# Patient Record
Sex: MALE | URBAN - METROPOLITAN AREA
[De-identification: names, ages, dates, MRNs, and addresses within clinical notes are randomized per-mention and may not be internally consistent; named-entity substitution may affect disease eponyms.]

---

## 2023-08-16 ENCOUNTER — APPOINTMENT (OUTPATIENT)
Dept: URBAN - METROPOLITAN AREA CLINIC 253 | Age: 88
Setting detail: DERMATOLOGY
End: 2023-08-17

## 2023-08-16 VITALS — HEIGHT: 67 IN | RESPIRATION RATE: 14 BRPM | WEIGHT: 170 LBS

## 2023-08-16 DIAGNOSIS — L72.0 EPIDERMAL CYST: ICD-10-CM

## 2023-08-16 PROBLEM — C44.519 BASAL CELL CARCINOMA OF SKIN OF OTHER PART OF TRUNK: Status: ACTIVE | Noted: 2023-08-16

## 2023-08-16 PROCEDURE — OTHER ADDITIONAL NOTES: OTHER

## 2023-08-16 PROCEDURE — 99202 OFFICE O/P NEW SF 15 MIN: CPT | Mod: 25

## 2023-08-16 PROCEDURE — OTHER CURETTAGE AND DESTRUCTION: OTHER

## 2023-08-16 PROCEDURE — 17263 DSTRJ MAL LES T/A/L 2.1-3.0: CPT

## 2023-08-16 PROCEDURE — OTHER REVIEW OF OUTSIDE PATHOLOGY REPORTS: OTHER

## 2023-08-16 PROCEDURE — OTHER MIPS QUALITY: OTHER

## 2023-08-16 PROCEDURE — OTHER COUNSELING: OTHER

## 2023-08-16 ASSESSMENT — LOCATION SIMPLE DESCRIPTION DERM: LOCATION SIMPLE: LEFT CHEEK

## 2023-08-16 ASSESSMENT — LOCATION ZONE DERM: LOCATION ZONE: FACE

## 2023-08-16 ASSESSMENT — LOCATION DETAILED DESCRIPTION DERM: LOCATION DETAILED: LEFT SUPERIOR CENTRAL MALAR CHEEK

## 2023-08-16 NOTE — PROCEDURE: ADDITIONAL NOTES
Additional Notes: Reviewed outside pathology report with patient and patients daughter. Patients daughter informed patient is scheduled for Mohs procedure at outside clinic through [facility] on 11/30/2023. Patient states he “is 89 years old and is okay with watching lesion.” Discussed RBSE of both ED&C vs Mohs procedure in detail. Patient states he would rather take care of lesion now then have to worry and wait for surgery. ED&C procedure completed today without complications. Informed patient and daughter of success rates of both procedures. Recommend follow up to recheck site Additional Notes: Reviewed outside pathology report with patient and patients daughter. Patients daughter informed patient is scheduled for Mohs procedure at outside clinic through [facility] on 11/30/2023. Patient states he “is 89 years old and is okay with watching lesion.” Discussed RBSE of both ED&C vs Mohs procedure in detail. Patient states he would rather take care of lesion now then have to worry and wait for surgery. ED&C procedure completed today without complications. Informed patient and daughter of success rates of both procedures. Recommend follow up to recheck site

## 2023-08-16 NOTE — PROCEDURE: CURETTAGE AND DESTRUCTION
Body Location Override (Optional - Billing Will Still Be Based On Selected Body Map Location If Applicable): left upper chest
Detail Level: Detailed
Number Of Curettages: 3
Size Of Lesion In Cm: 2
Size Of Lesion After Curettage: 2.5
Add Intralesional Injection: No
Total Volume (Ccs): 1
Anesthesia Type: 1% lidocaine with epinephrine and a 1:10 solution of 8.4% sodium bicarbonate
Cautery Type: electrodesiccation
What Was Performed First?: Curettage
Final Size Statement: The size of the lesion after curettage was
Additional Information: (Optional): The wound was cleaned, and a pressure dressing was applied.  The patient received detailed post-op instructions.
Consent was obtained from the patient. The risks, benefits and alternatives to therapy were discussed in detail. Specifically, the risks of infection, scarring, bleeding, prolonged wound healing, nerve injury, incomplete removal, allergy to anesthesia and recurrence were addressed. Alternatives to ED&C, such as: surgical removal and XRT were also discussed.  Prior to the procedure, the treatment site was clearly identified and confirmed by the patient. All components of Universal Protocol/PAUSE Rule completed.
Render Post-Care Instructions In Note?: yes
Post-Care Instructions: I reviewed with the patient in detail post-care instructions. Patient is to keep the area dry for 48 hours, and not to engage in any swimming until the area is healed. Should the patient develop any fevers, chills, bleeding, severe pain patient will contact the office immediately.
Bill As A Line Item Or As Units: Line Item

## 2023-08-16 NOTE — HPI: SKIN LESION (BASAL CELL CARCINOMA)
How Severe Is Your Skin Cancer?: moderate
Is This A New Presentation, Or A Follow-Up?: Basal Cell Carcinoma
Additional History: He presents today to discusss tx of a biopsy proven BCC on the left upper chest. He and his daughter Pat are concerned with having to wait until 11/30/23 to have treated with [facility].
Location From Outside Provider (Will Override Previously Chosen Location): [facility]
When Was Basal Cell Biopsied? (Optional): 8/3/23

## 2023-08-16 NOTE — PROCEDURE: COUNSELING
Detail Level: Detailed
Patient Specific Counseling (Will Not Stick From Patient To Patient): Reassured him that it benign appearing on exam.

## 2023-08-16 NOTE — PROCEDURE: REVIEW OF OUTSIDE PATHOLOGY REPORTS
Detail Level: Zone
Summary Of Outside Pathology: Nodular basal cell carcinoma of left upper chest, margins involved

## 2024-10-06 ENCOUNTER — HOSPITAL ENCOUNTER (OUTPATIENT)
Facility: CLINIC | Age: 89
Setting detail: OBSERVATION
Discharge: HOME | End: 2024-10-07
Attending: EMERGENCY MEDICINE | Admitting: STUDENT IN AN ORGANIZED HEALTH CARE EDUCATION/TRAINING PROGRAM
Payer: MEDICARE

## 2024-10-06 ENCOUNTER — APPOINTMENT (OUTPATIENT)
Dept: MRI IMAGING | Facility: CLINIC | Age: 89
End: 2024-10-06
Attending: EMERGENCY MEDICINE
Payer: MEDICARE

## 2024-10-06 ENCOUNTER — APPOINTMENT (OUTPATIENT)
Dept: CT IMAGING | Facility: CLINIC | Age: 89
End: 2024-10-06
Attending: EMERGENCY MEDICINE
Payer: MEDICARE

## 2024-10-06 ENCOUNTER — APPOINTMENT (OUTPATIENT)
Dept: ULTRASOUND IMAGING | Facility: CLINIC | Age: 89
End: 2024-10-06
Attending: EMERGENCY MEDICINE
Payer: MEDICARE

## 2024-10-06 DIAGNOSIS — G45.9 TIA (TRANSIENT ISCHEMIC ATTACK): ICD-10-CM

## 2024-10-06 DIAGNOSIS — R29.818 TRANSIENT NEUROLOGIC DEFICIT: Primary | ICD-10-CM

## 2024-10-06 DIAGNOSIS — R47.01 EXPRESSIVE APHASIA: ICD-10-CM

## 2024-10-06 LAB
ALBUMIN UR-MCNC: NEGATIVE MG/DL
ANION GAP SERPL CALCULATED.3IONS-SCNC: 12 MMOL/L (ref 7–15)
APPEARANCE UR: CLEAR
APTT PPP: 28 SECONDS (ref 22–38)
BASOPHILS # BLD AUTO: 0 10E3/UL (ref 0–0.2)
BASOPHILS NFR BLD AUTO: 0 %
BILIRUB UR QL STRIP: NEGATIVE
BUN SERPL-MCNC: 29 MG/DL (ref 8–23)
CALCIUM SERPL-MCNC: 8.9 MG/DL (ref 8.8–10.4)
CHLORIDE SERPL-SCNC: 99 MMOL/L (ref 98–107)
COLOR UR AUTO: NORMAL
CREAT SERPL-MCNC: 1.1 MG/DL (ref 0.67–1.17)
CRP SERPL-MCNC: 14.61 MG/L
EGFRCR SERPLBLD CKD-EPI 2021: 61 ML/MIN/1.73M2
EOSINOPHIL # BLD AUTO: 0.3 10E3/UL (ref 0–0.7)
EOSINOPHIL NFR BLD AUTO: 4 %
ERYTHROCYTE [DISTWIDTH] IN BLOOD BY AUTOMATED COUNT: 14.6 % (ref 10–15)
ERYTHROCYTE [SEDIMENTATION RATE] IN BLOOD BY WESTERGREN METHOD: 26 MM/HR (ref 0–20)
EST. AVERAGE GLUCOSE BLD GHB EST-MCNC: 117 MG/DL
GLUCOSE SERPL-MCNC: 130 MG/DL (ref 70–99)
GLUCOSE UR STRIP-MCNC: NEGATIVE MG/DL
HBA1C MFR BLD: 5.7 %
HCO3 SERPL-SCNC: 22 MMOL/L (ref 22–29)
HCT VFR BLD AUTO: 34 % (ref 40–53)
HGB BLD-MCNC: 11.1 G/DL (ref 13.3–17.7)
HGB UR QL STRIP: NEGATIVE
HOLD SPECIMEN: NORMAL
IMM GRANULOCYTES # BLD: 0.1 10E3/UL
IMM GRANULOCYTES NFR BLD: 1 %
INR PPP: 1.03 (ref 0.85–1.15)
KETONES UR STRIP-MCNC: NEGATIVE MG/DL
LEUKOCYTE ESTERASE UR QL STRIP: NEGATIVE
LYMPHOCYTES # BLD AUTO: 0.9 10E3/UL (ref 0.8–5.3)
LYMPHOCYTES NFR BLD AUTO: 11 %
MCH RBC QN AUTO: 30.4 PG (ref 26.5–33)
MCHC RBC AUTO-ENTMCNC: 32.6 G/DL (ref 31.5–36.5)
MCV RBC AUTO: 93 FL (ref 78–100)
MONOCYTES # BLD AUTO: 0.6 10E3/UL (ref 0–1.3)
MONOCYTES NFR BLD AUTO: 7 %
NEUTROPHILS # BLD AUTO: 6.6 10E3/UL (ref 1.6–8.3)
NEUTROPHILS NFR BLD AUTO: 78 %
NITRATE UR QL: NEGATIVE
NRBC # BLD AUTO: 0 10E3/UL
NRBC BLD AUTO-RTO: 0 /100
PH UR STRIP: 6.5 [PH] (ref 5–7)
PLATELET # BLD AUTO: 239 10E3/UL (ref 150–450)
POTASSIUM SERPL-SCNC: 4.3 MMOL/L (ref 3.4–5.3)
RBC # BLD AUTO: 3.65 10E6/UL (ref 4.4–5.9)
RBC URINE: <1 /HPF
SODIUM SERPL-SCNC: 133 MMOL/L (ref 135–145)
SP GR UR STRIP: 1.02 (ref 1–1.03)
TROPONIN T SERPL HS-MCNC: 26 NG/L
UROBILINOGEN UR STRIP-MCNC: NORMAL MG/DL
WBC # BLD AUTO: 8.5 10E3/UL (ref 4–11)
WBC URINE: <1 /HPF

## 2024-10-06 PROCEDURE — 255N000002 HC RX 255 OP 636: Performed by: EMERGENCY MEDICINE

## 2024-10-06 PROCEDURE — 70496 CT ANGIOGRAPHY HEAD: CPT | Mod: MG

## 2024-10-06 PROCEDURE — 80061 LIPID PANEL: CPT | Performed by: STUDENT IN AN ORGANIZED HEALTH CARE EDUCATION/TRAINING PROGRAM

## 2024-10-06 PROCEDURE — 93005 ELECTROCARDIOGRAM TRACING: CPT

## 2024-10-06 PROCEDURE — 250N000011 HC RX IP 250 OP 636: Performed by: EMERGENCY MEDICINE

## 2024-10-06 PROCEDURE — 85610 PROTHROMBIN TIME: CPT | Performed by: EMERGENCY MEDICINE

## 2024-10-06 PROCEDURE — 83036 HEMOGLOBIN GLYCOSYLATED A1C: CPT | Performed by: STUDENT IN AN ORGANIZED HEALTH CARE EDUCATION/TRAINING PROGRAM

## 2024-10-06 PROCEDURE — 86140 C-REACTIVE PROTEIN: CPT | Performed by: EMERGENCY MEDICINE

## 2024-10-06 PROCEDURE — 84484 ASSAY OF TROPONIN QUANT: CPT | Performed by: EMERGENCY MEDICINE

## 2024-10-06 PROCEDURE — 99222 1ST HOSP IP/OBS MODERATE 55: CPT | Mod: AI | Performed by: STUDENT IN AN ORGANIZED HEALTH CARE EDUCATION/TRAINING PROGRAM

## 2024-10-06 PROCEDURE — 85025 COMPLETE CBC W/AUTO DIFF WBC: CPT | Performed by: EMERGENCY MEDICINE

## 2024-10-06 PROCEDURE — 99285 EMERGENCY DEPT VISIT HI MDM: CPT | Mod: 25

## 2024-10-06 PROCEDURE — 80048 BASIC METABOLIC PNL TOTAL CA: CPT | Performed by: EMERGENCY MEDICINE

## 2024-10-06 PROCEDURE — A9585 GADOBUTROL INJECTION: HCPCS | Performed by: EMERGENCY MEDICINE

## 2024-10-06 PROCEDURE — 250N000013 HC RX MED GY IP 250 OP 250 PS 637: Performed by: EMERGENCY MEDICINE

## 2024-10-06 PROCEDURE — 70553 MRI BRAIN STEM W/O & W/DYE: CPT | Mod: MG

## 2024-10-06 PROCEDURE — 70498 CT ANGIOGRAPHY NECK: CPT | Mod: MG

## 2024-10-06 PROCEDURE — 85652 RBC SED RATE AUTOMATED: CPT | Performed by: EMERGENCY MEDICINE

## 2024-10-06 PROCEDURE — G0378 HOSPITAL OBSERVATION PER HR: HCPCS

## 2024-10-06 PROCEDURE — 93880 EXTRACRANIAL BILAT STUDY: CPT

## 2024-10-06 PROCEDURE — 85730 THROMBOPLASTIN TIME PARTIAL: CPT | Performed by: EMERGENCY MEDICINE

## 2024-10-06 PROCEDURE — G1010 CDSM STANSON: HCPCS

## 2024-10-06 PROCEDURE — 99207 PR NO BILLABLE SERVICE THIS VISIT: CPT | Performed by: NURSE PRACTITIONER

## 2024-10-06 PROCEDURE — 36415 COLL VENOUS BLD VENIPUNCTURE: CPT | Performed by: EMERGENCY MEDICINE

## 2024-10-06 PROCEDURE — 81001 URINALYSIS AUTO W/SCOPE: CPT | Performed by: EMERGENCY MEDICINE

## 2024-10-06 RX ORDER — ASPIRIN 81 MG/1
81 TABLET ORAL DAILY
Status: DISCONTINUED | OUTPATIENT
Start: 2024-10-07 | End: 2024-10-07 | Stop reason: HOSPADM

## 2024-10-06 RX ORDER — CLOPIDOGREL BISULFATE 75 MG/1
75 TABLET ORAL DAILY
Status: DISCONTINUED | OUTPATIENT
Start: 2024-10-07 | End: 2024-10-07 | Stop reason: HOSPADM

## 2024-10-06 RX ORDER — ONDANSETRON 4 MG/1
4 TABLET, ORALLY DISINTEGRATING ORAL EVERY 6 HOURS PRN
Status: DISCONTINUED | OUTPATIENT
Start: 2024-10-06 | End: 2024-10-07 | Stop reason: HOSPADM

## 2024-10-06 RX ORDER — IOPAMIDOL 755 MG/ML
67 INJECTION, SOLUTION INTRAVASCULAR ONCE
Status: COMPLETED | OUTPATIENT
Start: 2024-10-06 | End: 2024-10-06

## 2024-10-06 RX ORDER — ASPIRIN 81 MG/1
81 TABLET, CHEWABLE ORAL ONCE
Status: COMPLETED | OUTPATIENT
Start: 2024-10-06 | End: 2024-10-06

## 2024-10-06 RX ORDER — AMOXICILLIN 250 MG
1 CAPSULE ORAL 2 TIMES DAILY PRN
Status: DISCONTINUED | OUTPATIENT
Start: 2024-10-06 | End: 2024-10-07 | Stop reason: HOSPADM

## 2024-10-06 RX ORDER — CLOPIDOGREL BISULFATE 75 MG/1
300 TABLET ORAL ONCE
Status: COMPLETED | OUTPATIENT
Start: 2024-10-06 | End: 2024-10-06

## 2024-10-06 RX ORDER — ONDANSETRON 2 MG/ML
4 INJECTION INTRAMUSCULAR; INTRAVENOUS EVERY 6 HOURS PRN
Status: DISCONTINUED | OUTPATIENT
Start: 2024-10-06 | End: 2024-10-07 | Stop reason: HOSPADM

## 2024-10-06 RX ORDER — AMOXICILLIN 250 MG
2 CAPSULE ORAL 2 TIMES DAILY PRN
Status: DISCONTINUED | OUTPATIENT
Start: 2024-10-06 | End: 2024-10-07 | Stop reason: HOSPADM

## 2024-10-06 RX ORDER — GADOBUTROL 604.72 MG/ML
8 INJECTION INTRAVENOUS ONCE
Status: COMPLETED | OUTPATIENT
Start: 2024-10-06 | End: 2024-10-06

## 2024-10-06 RX ADMIN — IOPAMIDOL 67 ML: 755 INJECTION, SOLUTION INTRAVENOUS at 15:59

## 2024-10-06 RX ADMIN — GADOBUTROL 8 ML: 604.72 INJECTION INTRAVENOUS at 17:51

## 2024-10-06 RX ADMIN — ASPIRIN 81 MG CHEWABLE TABLET 81 MG: 81 TABLET CHEWABLE at 21:30

## 2024-10-06 RX ADMIN — CLOPIDOGREL BISULFATE 300 MG: 75 TABLET ORAL at 21:30

## 2024-10-06 ASSESSMENT — COLUMBIA-SUICIDE SEVERITY RATING SCALE - C-SSRS
2. HAVE YOU ACTUALLY HAD ANY THOUGHTS OF KILLING YOURSELF IN THE PAST MONTH?: NO
6. HAVE YOU EVER DONE ANYTHING, STARTED TO DO ANYTHING, OR PREPARED TO DO ANYTHING TO END YOUR LIFE?: NO
1. IN THE PAST MONTH, HAVE YOU WISHED YOU WERE DEAD OR WISHED YOU COULD GO TO SLEEP AND NOT WAKE UP?: NO

## 2024-10-06 ASSESSMENT — ACTIVITIES OF DAILY LIVING (ADL)
ADLS_ACUITY_SCORE: 35

## 2024-10-06 NOTE — ED TRIAGE NOTES
Pt c/o L arm numbness which started this morning around 9 am- stated numbness went from his fingers to his upper arm. Also stated his vision went black. VSS Hx macular degeneration- full code.      Triage Assessment (Adult)       Row Name 10/06/24 1405          Triage Assessment    Airway WDL WDL        Respiratory WDL    Respiratory WDL WDL        Skin Circulation/Temperature WDL    Skin Circulation/Temperature WDL WDL        Cardiac WDL    Cardiac WDL WDL

## 2024-10-06 NOTE — CONSULTS
"  [facility]    Stroke Telephone Note    I was called by Lance Merino on 10/06/24 regarding patient Berhane Ely. The patient is a 89 year old male with PMH of polymyalgia rheumatica, HTN, HLD. He presents to the ED for evaluation of confusion, speech changes. Per family, yesterday he had an episode where he appeared confused, was having difficulty speaking and running/bumping into things with his walker. He also noted some LUE numbness. Symptoms resolved. He then had another similar episode this morning that lasted about 60 minutes and resolved. Per ED provider no current deficits on exam, patient appears to be at/near baseline.    Vitals  BP: 122/61   Pulse: 67   Resp: 20   Temp: 97.4  F (36.3  C)   Weight: 75.3 kg (166 lb 0.1 oz)    Imaging Findings  Pending     Impression  Two episodes of confusion, altered speech, LUE numbness. Concern for possible TIA with vascular pathology (ie fixed stenosis) vs TFNE vs other     Recommendations  -CT/CTA; if unrevealing recommend brain MRI w/wo contrast + SWI  -ESR, CRP    Case discussed with vascular neurology attending Dr. Sierra.    My recommendations are based on the information provided over the phone by Berhane Ely's in-person providers. They are not intended to replace the clinical judgment of his in-person providers. I was not requested to personally see or examine the patient at this time.     AL Sellers CNP  Vascular Neurology    To page me or covering stroke neurology team member, click here: [facility]COM  Choose \"On Call\" tab at top, then select \"NEUROLOGY/ALL SITES\" from middle drop-down box, press Enter, then look for \"stroke\" or \"telestroke\" for your site.   "

## 2024-10-06 NOTE — ED PROVIDER NOTES
"  Emergency Department Note      History of Present Illness     Chief Complaint   Numbness, confusion    HPI   Berhane Ely is a 89 year old male who presents with daughter and son-in-law for evaluation of left arm numbness and confusion.  Patient lives independently with his wife.  Daughter states she was called by her mother yesterday morning as she was concerned patient was having trouble speaking and running into things with his walker which is abnormal for him.  When she arrived around 3PM, patient apparently was at his baseline and was not having any trouble with speaking and she did not notice anything off.  Patient also stated to her he had left arm numbness and tingling but does not have this currently.  He says this is a chronic problem for him but was more intense yesterday.  She again received phone call from her mom today as patient was running into things and was worried about his vision.  When she got there at 12PM, she noticed patient was having trouble expressing what he wanted to say (e.x. getting stuck at saying \"Junaid, Junaid, Trump\").  This lasted for about 1 hour and has completely resolved since arriving to the ER.  Patient denies headache, dizziness, change or loss in his baseline vision, current numbness, extremity weakness, fever, chest pain, SOB, UTI symptoms.      Independent Historian   Daughter and son-in-law    Review of External Notes   None    Past Medical History     Medical History and Problem List   Age-related osteoporosis without current pathological fracture 10/02/2017     Low back pain 05/16/2017     Polymyalgia rheumatica 12/02/2016     Long term (current) use of systemic steroids 12/02/2016     Multiple joint pain 10/07/2016     Acute pain of both shoulders 10/07/2016     Elevated erythrocyte sedimentation rate 10/07/2016     History of gout 10/07/2016        Medications   simvastatin (ZOCOR) 20 MG tablet  TK 1 T PO HS   1 08/30/2016   Active   amLODIPine (NORVASC) 2.5 MG " tablet  TK 1 T PO D   1 08/08/2016   Active   losartan (COZAAR) 100 MG tablet  TK 1 T PO D   1 07/02/2016   Active   aspirin 81 MG tablet  Take 81 mg by mouth daily.         Active   acetaminophen (TYLENOL) 325 MG tablet  Take 325-650 mg by mouth every 4 hours as needed for Pain.         Active   Multiple Vitamins-Minerals (ICAPS AREDS 2 OR)  1 Tab two times a day.         Active   drug not in computer  Avastin injection in right eye as needed. No injection received since December.         Active   calcium carbonate-vitamin D (OS-LIANET 500 WITH D) 500-200 MG-UNIT per tablet   Indications: Polymyalgia rheumatica (HRC) Take 1 Tab by mouth two times a day.         Active   tamsulosin (FLOMAX) 0.4 MG CAPS capsule   Indications: Polymyalgia rheumatica (HRC) Take 2 Caps by mouth daily at bedtime.   1 09/12/2017   Active   Wheat Dextrin (BENEFIBER)   Indications: Polymyalgia rheumatica (HRC) 2 tsp daily.         Active   Multiple Vitamins-Minerals (ICAPS AREDS 2 OR)  1 Tablet two times a day.         Active     Surgical History   No past surgical history on file.    Physical Exam     Patient Vitals for the past 24 hrs:   BP Temp Temp src Pulse Resp SpO2 Weight   10/06/24 1730 (!) 153/60 -- -- 60 -- 98 % --   10/06/24 1700 (!) 149/60 -- -- 59 -- 100 % --   10/06/24 1635 (!) 151/91 -- -- 65 -- 99 % --   10/06/24 1620 110/64 -- -- 63 -- 98 % --   10/06/24 1540 135/58 -- -- 64 -- 99 % --   10/06/24 1526 -- -- -- 65 -- 99 % --   10/06/24 1511 125/59 -- -- 79 -- 99 % --   10/06/24 1456 (!) 142/79 -- -- 70 -- 100 % --   10/06/24 1441 (!) 154/64 -- -- 66 -- 100 % --   10/06/24 1430 122/61 -- -- 67 -- 97 % --   10/06/24 1424 -- -- -- -- -- -- 75.3 kg (166 lb 0.1 oz)   10/06/24 1417 (!) 141/75 -- -- 81 -- 99 % --   10/06/24 1404 (!) 125/95 97.4  F (36.3  C) Temporal 94 20 -- --     Physical Exam  General: Alert, no acute distress; well appearing; [place], answering questions appropriately  Neuro:    Alert and oriented x 3.    Speech is  normal and fluent. No dysarthria.   Face is symmetric without droop. CN's II-XII grossly intact. Negative pronator drift.   Right Arm: Good  strength. 5/5 elbow flexion. 5/5 elbow extension. Sensation intact to light touch.   Left Arm: Good  strength. 5/5 elbow flexion. 5/5 elbow extension. Sensation intact to light touch.   Right Le/5 straight leg raise. Sensation intact to light touch.   Left Le/5 straight leg raise. Sensation intact to light touch.       Romberg and gait: Deferred  HEENT:  Moist mucous membranes.  Conjunctiva normal.  No temporal artery tenderness or scalp tenderness.   CV:  RRR, no m/r/g, skin warm and well perfused  Pulm:  CTAB, no wheezes/ronchi/rales.  No acute distress, breathing comfortably  GI:  Soft, nontender, nondistended.  No rebound or guarding.    MSK:  Moving all extremities.  No focal areas of edema, erythema  Skin:  WWP, no rashes, no lower extremity edema, skin color normal  Psych:  Well-appearing, normal affect, regular speech    Diagnostics     Lab Results   Labs Ordered and Resulted from Time of ED Arrival to Time of ED Departure   BASIC METABOLIC PANEL - Abnormal       Result Value    Sodium 133 (*)     Potassium 4.3      Chloride 99      Carbon Dioxide (CO2) 22      Anion Gap 12      Urea Nitrogen 29.0 (*)     Creatinine 1.10      GFR Estimate 61      Calcium 8.9      Glucose 130 (*)    TROPONIN T, HIGH SENSITIVITY - Abnormal    Troponin T, High Sensitivity 26 (*)    CBC WITH PLATELETS AND DIFFERENTIAL - Abnormal    WBC Count 8.5      RBC Count 3.65 (*)     Hemoglobin 11.1 (*)     Hematocrit 34.0 (*)     MCV 93      MCH 30.4      MCHC 32.6      RDW 14.6      Platelet Count 239      % Neutrophils 78      % Lymphocytes 11      % Monocytes 7      % Eosinophils 4      % Basophils 0      % Immature Granulocytes 1      NRBCs per 100 WBC 0      Absolute Neutrophils 6.6      Absolute Lymphocytes 0.9      Absolute Monocytes 0.6      Absolute Eosinophils 0.3       Absolute Basophils 0.0      Absolute Immature Granulocytes 0.1      Absolute NRBCs 0.0     ERYTHROCYTE SEDIMENTATION RATE AUTO - Abnormal    Erythrocyte Sedimentation Rate 26 (*)    CRP INFLAMMATION - Abnormal    CRP Inflammation 14.61 (*)    INR - Normal    INR 1.03     PARTIAL THROMBOPLASTIN TIME - Normal    aPTT 28     GLUCOSE MONITOR NURSING POCT       Imaging   CTA Head Neck with Contrast   Final Result   IMPRESSION:    HEAD CT:   1.  No CT evidence for acute intracranial process.   2.  Brain atrophy and presumed chronic microvascular ischemic changes as above.      HEAD CTA:    1.  No large vessel occlusion or hemodynamically significant stenosis throughout the [place] arteries.      NECK CTA:   1.  High-grade approximately 90% stenosis in the proximal left internal carotid artery. Otherwise, no hemodynamically significant stenosis in the neck vessels.    2.  No evidence for dissection.      CT Head w/o Contrast   Final Result   IMPRESSION:    HEAD CT:   1.  No CT evidence for acute intracranial process.   2.  Brain atrophy and presumed chronic microvascular ischemic changes as above.      HEAD CTA:    1.  No large vessel occlusion or hemodynamically significant stenosis throughout the [place] arteries.      NECK CTA:   1.  High-grade approximately 90% stenosis in the proximal left internal carotid artery. Otherwise, no hemodynamically significant stenosis in the neck vessels.    2.  No evidence for dissection.      US Carotid Bilateral    (Results Pending)   MR Brain w/o & w Contrast    (Results Pending)       EKG   ECG results from 10/06/24   EKG 12 lead     Value    Systolic Blood Pressure     Diastolic Blood Pressure     Ventricular Rate 76    Atrial Rate 76    CA Interval 156    QRS Duration 86        QTc 445    P Axis -1    R AXIS -5    T Axis 2    Interpretation ECG      Sinus rhythm with Premature supraventricular complexes  Minimal voltage criteria for LVH, may be normal  variant ( R in aVL )  Borderline ECG  When compared with ECG of 11-Oct-2006 07:30,  Premature supraventricular complexes are now Present  T wave inversion now evident in Inferior leads           Independent Interpretation   None    ED Course      Medications Administered   Medications   iopamidol (ISOVUE-370) solution 67 mL (67 mLs Intravenous $Given 10/6/24 2893)   sodium chloride (PF) 0.9% PF flush 80 mL (80 mLs Intravenous $Given 10/6/24 7656)   gadobutrol (GADAVIST) injection 8 mL (8 mLs Intravenous $Given 10/6/24 4393)       Procedures   Procedures     Discussion of Management   Stroke Neurology, Isa VILLALOBOS. Agrees with imaging; requests ESR/CRP due to hx of PMR.     Stroke Neurology, Dr. Sierra.  Recommends carotid duplex US, MRI brain.  Will follow up with results for recommendations and final dispo.    ED Course        Additional Documentation  None    Medical Decision Making / Diagnosis     CMS Diagnoses: None    MIPS       None    MDM   Berhane Ely is a 89 year old male with history of HTN, HLD presenting with family for concerns of now resolved symptoms of left arm numbness and confusion.  See above for the details in the HPI.  Description of confusion seems to be most consistent with expressive aphasia which apparently happened yesterday and again this morning per daughter.  There has been no weakness or facial drooping. He is oriented and answering questions appropriately without dysarthria or aphasia and has an unremarkable neuro exam.  I am most concerned for crescendo TIA given his symptoms.  He is not a candidate for thrombolytics.  Initial CT imaging is negative, left internal CA noted to have 90% stenosis.  EKG is unremarkable; initial troponin is only mildly elevated, delta troponin is pending. He has no chest pain and I have very low suspicion for ACS at this time. Additional basic labs are unremarkable; ESR and CRP requested by stroke neuro due to hx of PMR are slightly elevated  and nonspecific; he has no headache or scalp/temporal artery tenderness concerning for giant cell arteritis. Patient remains neurologically at his baseline currently.  He is awaiting MRI brain and carotid US at the request of stroke neuro - patient signed out to my partner pending imaging and stroke neuro recommendations for final disposition.     Disposition   Pending MRI, duplex carotid US and stroke neurology follow up recommendatoins    Diagnosis     ICD-10-CM    1. TIA (transient ischemic attack)  G45.9       2. Expressive aphasia  R47.01            Discharge Medications   New Prescriptions    No medications on file         MD Angelique Funk Edgar Ronald, MD  10/06/24 [id number]

## 2024-10-07 ENCOUNTER — APPOINTMENT (OUTPATIENT)
Dept: PHYSICAL THERAPY | Facility: CLINIC | Age: 89
End: 2024-10-07
Attending: STUDENT IN AN ORGANIZED HEALTH CARE EDUCATION/TRAINING PROGRAM
Payer: MEDICARE

## 2024-10-07 ENCOUNTER — APPOINTMENT (OUTPATIENT)
Dept: CARDIOLOGY | Facility: CLINIC | Age: 89
End: 2024-10-07
Attending: STUDENT IN AN ORGANIZED HEALTH CARE EDUCATION/TRAINING PROGRAM
Payer: MEDICARE

## 2024-10-07 ENCOUNTER — APPOINTMENT (OUTPATIENT)
Dept: MRI IMAGING | Facility: CLINIC | Age: 89
End: 2024-10-07
Payer: MEDICARE

## 2024-10-07 VITALS
OXYGEN SATURATION: 97 % | BODY MASS INDEX: 25.13 KG/M2 | SYSTOLIC BLOOD PRESSURE: 140 MMHG | HEIGHT: 67 IN | RESPIRATION RATE: 18 BRPM | WEIGHT: 160.1 LBS | TEMPERATURE: 98 F | HEART RATE: 69 BPM | DIASTOLIC BLOOD PRESSURE: 76 MMHG

## 2024-10-07 LAB
ANION GAP SERPL CALCULATED.3IONS-SCNC: 13 MMOL/L (ref 7–15)
ATRIAL RATE - MUSE: 76 BPM
BUN SERPL-MCNC: 20.5 MG/DL (ref 8–23)
CALCIUM SERPL-MCNC: 8.6 MG/DL (ref 8.8–10.4)
CHLORIDE SERPL-SCNC: 102 MMOL/L (ref 98–107)
CHOLEST SERPL-MCNC: 152 MG/DL
CREAT SERPL-MCNC: 0.97 MG/DL (ref 0.67–1.17)
DIASTOLIC BLOOD PRESSURE - MUSE: NORMAL MMHG
EGFRCR SERPLBLD CKD-EPI 2021: 71 ML/MIN/1.73M2
ERYTHROCYTE [DISTWIDTH] IN BLOOD BY AUTOMATED COUNT: 14.4 % (ref 10–15)
GLUCOSE SERPL-MCNC: 109 MG/DL (ref 70–99)
HCO3 SERPL-SCNC: 22 MMOL/L (ref 22–29)
HCT VFR BLD AUTO: 33.1 % (ref 40–53)
HDLC SERPL-MCNC: 57 MG/DL
HGB BLD-MCNC: 10.9 G/DL (ref 13.3–17.7)
INTERPRETATION ECG - MUSE: NORMAL
LDLC SERPL CALC-MCNC: 66 MG/DL
LVEF ECHO: NORMAL
MCH RBC QN AUTO: 30.5 PG (ref 26.5–33)
MCHC RBC AUTO-ENTMCNC: 32.9 G/DL (ref 31.5–36.5)
MCV RBC AUTO: 93 FL (ref 78–100)
NONHDLC SERPL-MCNC: 95 MG/DL
P AXIS - MUSE: -1 DEGREES
PLATELET # BLD AUTO: 244 10E3/UL (ref 150–450)
POTASSIUM SERPL-SCNC: 4 MMOL/L (ref 3.4–5.3)
PR INTERVAL - MUSE: 156 MS
QRS DURATION - MUSE: 86 MS
QT - MUSE: 396 MS
QTC - MUSE: 445 MS
R AXIS - MUSE: -5 DEGREES
RBC # BLD AUTO: 3.57 10E6/UL (ref 4.4–5.9)
SODIUM SERPL-SCNC: 137 MMOL/L (ref 135–145)
SYSTOLIC BLOOD PRESSURE - MUSE: NORMAL MMHG
T AXIS - MUSE: 2 DEGREES
TRIGL SERPL-MCNC: 146 MG/DL
VENTRICULAR RATE- MUSE: 76 BPM
WBC # BLD AUTO: 8.2 10E3/UL (ref 4–11)

## 2024-10-07 PROCEDURE — 97530 THERAPEUTIC ACTIVITIES: CPT | Mod: GP

## 2024-10-07 PROCEDURE — 250N000013 HC RX MED GY IP 250 OP 250 PS 637: Performed by: STUDENT IN AN ORGANIZED HEALTH CARE EDUCATION/TRAINING PROGRAM

## 2024-10-07 PROCEDURE — G0378 HOSPITAL OBSERVATION PER HR: HCPCS

## 2024-10-07 PROCEDURE — 99239 HOSP IP/OBS DSCHRG MGMT >30: CPT | Performed by: STUDENT IN AN ORGANIZED HEALTH CARE EDUCATION/TRAINING PROGRAM

## 2024-10-07 PROCEDURE — 97116 GAIT TRAINING THERAPY: CPT | Mod: GP

## 2024-10-07 PROCEDURE — 70551 MRI BRAIN STEM W/O DYE: CPT | Mod: MG

## 2024-10-07 PROCEDURE — G0426 INPT/ED TELECONSULT50: HCPCS | Mod: G0

## 2024-10-07 PROCEDURE — 85027 COMPLETE CBC AUTOMATED: CPT | Performed by: STUDENT IN AN ORGANIZED HEALTH CARE EDUCATION/TRAINING PROGRAM

## 2024-10-07 PROCEDURE — G1010 CDSM STANSON: HCPCS

## 2024-10-07 PROCEDURE — 97162 PT EVAL MOD COMPLEX 30 MIN: CPT | Mod: GP

## 2024-10-07 PROCEDURE — 80048 BASIC METABOLIC PNL TOTAL CA: CPT | Performed by: STUDENT IN AN ORGANIZED HEALTH CARE EDUCATION/TRAINING PROGRAM

## 2024-10-07 PROCEDURE — 93306 TTE W/DOPPLER COMPLETE: CPT

## 2024-10-07 PROCEDURE — 36415 COLL VENOUS BLD VENIPUNCTURE: CPT | Performed by: STUDENT IN AN ORGANIZED HEALTH CARE EDUCATION/TRAINING PROGRAM

## 2024-10-07 PROCEDURE — 93306 TTE W/DOPPLER COMPLETE: CPT | Mod: 26 | Performed by: INTERNAL MEDICINE

## 2024-10-07 RX ORDER — SENNOSIDES 8.6 MG
650-1300 CAPSULE ORAL EVERY 8 HOURS
COMMUNITY

## 2024-10-07 RX ORDER — ASPIRIN 81 MG/1
81 TABLET ORAL DAILY
Status: ON HOLD | COMMUNITY
End: 2024-10-07

## 2024-10-07 RX ORDER — FLUTICASONE PROPIONATE 50 MCG
1 SPRAY, SUSPENSION (ML) NASAL DAILY PRN
COMMUNITY

## 2024-10-07 RX ORDER — LOSARTAN POTASSIUM 50 MG/1
50 TABLET ORAL 2 TIMES DAILY
COMMUNITY

## 2024-10-07 RX ORDER — LORATADINE 10 MG/1
10 TABLET ORAL DAILY
COMMUNITY

## 2024-10-07 RX ORDER — TERAZOSIN 5 MG/1
5 CAPSULE ORAL 2 TIMES DAILY
COMMUNITY

## 2024-10-07 RX ORDER — ALBUTEROL SULFATE 0.83 MG/ML
2.5 SOLUTION RESPIRATORY (INHALATION) EVERY 4 HOURS PRN
COMMUNITY

## 2024-10-07 RX ORDER — ALBUTEROL SULFATE 90 UG/1
2 INHALANT RESPIRATORY (INHALATION) EVERY 4 HOURS PRN
COMMUNITY

## 2024-10-07 RX ORDER — POLYETHYLENE GLYCOL 3350 17 G/17G
17 POWDER, FOR SOLUTION ORAL DAILY
COMMUNITY

## 2024-10-07 RX ORDER — SIMVASTATIN 20 MG
20 TABLET ORAL AT BEDTIME
COMMUNITY

## 2024-10-07 RX ORDER — AMLODIPINE BESYLATE 2.5 MG/1
2.5 TABLET ORAL DAILY
COMMUNITY

## 2024-10-07 RX ADMIN — ASPIRIN 81 MG: 81 TABLET, COATED ORAL at 08:40

## 2024-10-07 RX ADMIN — CLOPIDOGREL BISULFATE 75 MG: 75 TABLET ORAL at 08:40

## 2024-10-07 ASSESSMENT — ACTIVITIES OF DAILY LIVING (ADL)
ADLS_ACUITY_SCORE: 36
ADLS_ACUITY_SCORE: 38
ADLS_ACUITY_SCORE: 38
ADLS_ACUITY_SCORE: 39
ADLS_ACUITY_SCORE: 35
ADLS_ACUITY_SCORE: 36
ADLS_ACUITY_SCORE: 36
ADLS_ACUITY_SCORE: 38
ADLS_ACUITY_SCORE: 36

## 2024-10-07 NOTE — PLAN OF CARE
ROOM #  204-1    Living Situation (if not independent, order SW consult): town house with wife  Facility name:  : Pat (daughter)    Activity level at baseline: Independent with a walker  Activity level on admit: assist of x1 with a walker    Who will be transporting you at discharge: family    Patient registered to observation; given Patient Bill of Rights; given the opportunity to ask questions about observation status and their plan of care.  Patient has been oriented to the observation room, bathroom and call light is in place.    Discussed discharge goals and expectations with patient/family.

## 2024-10-07 NOTE — PROGRESS NOTES
10/07/24 1400   Appointment Info   Signing Clinician's Name / Credentials (PT) Kristina Jiang, PT, DPT   Quick Adds   Quick Adds Certification   Living Environment   People in Home child(janel), adult;spouse   Current Living Arrangements house   Home Accessibility stairs to enter home;stairs within home   Number of Stairs, Main Entrance 5   Number of Stairs, Within Home, Primary ten   Stair Railings, Within Home, Primary railings on both sides of stairs   Transportation Anticipated family or friend will provide   Living Environment Comments Patient lives in a townhouse with his wife, son, and daughter-in-law.  There are stairs to enter from the garage.  Patient and his wife live on the upper level of the home, son and his spouse live downstairs.   Self-Care   Usual Activity Tolerance moderate   Current Activity Tolerance moderate   Regular Exercise No   Equipment Currently Used at Home walker, rolling   Fall history within last six months yes   Number of times patient has fallen within last six months 2   Activity/Exercise/Self-Care Comment Patient reports he uses a 4WW at home.   General Information   Onset of Illness/Injury or Date of Surgery 10/06/24   Referring Physician Mark Bonner MD   Patient/Family Therapy Goals Statement (PT) Patient is eager to discharge home.   Pertinent History of Current Problem (include personal factors and/or comorbidities that impact the POC) 89 year old male with a PMH significant for polymyalgia rheumatica, HTN, HLD, macular degeneration (left eye has little vision at baseline), right eye (undergoing injections to preserve vision), [place].  Berhane presented to the ED on 10/6 for evaluation of confusion and speech changes.  Patient had left hand numbness that then gradually spread proximally to include his arm.  He also noted difficulty controlling his left hand, and possibly right hand numbness and left leg numbness to a lesser degree.  He endorses his symptoms lasting for several  "hours before resolving.   Existing Precautions/Restrictions fall   Cognition   Affect/Mental Status (Cognition) confused   Orientation Status (Cognition) oriented to;person;place  (Patient able to state he was in a \"hospital.\")   Follows Commands (Cognition) follows one-step commands;75-90% accuracy;delayed response/completion;increased processing time needed;repetition of directions required  ([place])   Pain Assessment   Patient Currently in Pain No   Integumentary/Edema   Integumentary/Edema Comments Age-related skin changes   Posture    Posture Forward head position;Protracted shoulders   Range of Motion (ROM)   Range of Motion ROM is WFL   Strength (Manual Muscle Testing)   Strength (Manual Muscle Testing) Able to perform R SLR;Able to perform L SLR;Deficits observed during functional mobility   Bed Mobility   Comment, (Bed Mobility) SBA supine > sit.   Transfers   Comment, (Transfers) MinAx1 sit > stand from bed.   Gait/Stairs (Locomotion)   Comment, (Gait/Stairs) MinAx1 initially but progressing to CGA within 10 ft.  Using 4WW per baseline.   Balance   Balance Comments Impaired dynamic balance, uses 4WW but reporting 2 falls.   Sensory Examination   Sensory Perception Comments Macular degeneration (left eye has little vision at baseline) and right eye (undergoing injections to preserve vision).  Reports left sided numbness resolved.   Clinical Impression   Criteria for Skilled Therapeutic Intervention Yes, treatment indicated   PT Diagnosis (PT) Impaired functional mobility   Influenced by the following impairments Cognition (possibly with worsening confusion), impulsivity, weakness, deconditioning, impaired balance with high falls risk   Functional limitations due to impairments Impaired independence with bed mobility, transfers,  gait, and stairs   Clinical Presentation (PT Evaluation Complexity) evolving   Clinical Presentation Rationale Clinical judgement, PMH, social support   Clinical Decision Making " (Complexity) moderate complexity   Planned Therapy Interventions (PT) balance training;bed mobility training;gait training;home exercise program;neuromuscular re-education;patient/family education;postural re-education;stair training;strengthening;transfer training;progressive activity/exercise   Risk & Benefits of therapy have been explained evaluation/treatment results reviewed;care plan/treatment goals reviewed;risks/benefits reviewed;participants voiced agreement with care plan;participants included;patient;daughter   PT Total Evaluation Time   PT Eval, Moderate Complexity Minutes (49735) 13   Therapy Certification   Start of care date 10/07/24   Certification date from 10/07/24   Certification date to 10/14/24   Medical Diagnosis TIA   Physical Therapy Goals   PT Frequency Daily   PT Predicted Duration/Target Date for Goal Attainment 10/10/24   PT Goals Bed Mobility;Transfers;Gait;Stairs   PT: Bed Mobility Independent;Supine to/from sit;Rolling   PT: Transfers Modified independent;Sit to/from stand;Bed to/from chair;Assistive device   PT: Gait Supervision/stand-by assist;Rolling walker;150 feet   PT: Stairs Supervision/stand-by assist;10 stairs;Rail on both sides   Interventions   Interventions Quick Adds Gait Training;Therapeutic Activity   Therapeutic Activity   Therapeutic Activities: dynamic activities to improve functional performance Minutes (45421) 15   Symptoms Noted During/After Treatment Fatigue   Treatment Detail/Skilled Intervention Patient supine in bed, daughter present and attentive to patient's needs.  Patient very [place], needing frequent repetition of instruction.  Patient needing encouragement for PT session, does not seem to understanding role in hospital.  Daughter assisting with donning sock and shoes dependently.   Patient wanting to don bib overalls for session, offered second gown for back but patient declining.  Patient SBA for supine > sit, sitting SBA.  Impulsive, attempting to stand.   MinAx1 for standing balance and maxA to fasten pants.  Patient impulsively attempting to walk despite not having walker close.   CGA for standing balance with B hands on braked walker.  Patient seated on EOB at end of session, SBA.  Patient pointing to floor and asking if those socks were his.  No socks present on floor.  Possibly more confused per daughter.  With encouragement, patient swinging LEs into bed, wanting to keep overalls and shoes donned.  Left with bed alarm activated.   Gait Training   Gait Training Minutes (34671) 16   Symptoms Noted During/After Treatment (Gait Training) fatigue   Treatment Detail/Skilled Intervention Patient ambulates ~250' in antunez with 4WW and CGA.  Patient with improved gait stability when walking in  antunez vs in room, antunez is more open and has better lighting.  Patient negotiates flight of stairs with B rails.   Step-over-step pattern when ascending stairs, fast gait seed.  Step-to pattern to descend stairs, CGA-minAx1.  Patient unsteady but no overt loss of balance during session.  Should continue  to use walker at discharge.   Distance in Feet 250'   PT Discharge Planning   PT Plan Repeated sit <> stands, bring 4WW  to ambulate, negotiate stairs   PT Discharge Recommendation (DC Rec) home with assist;home with home care physical therapy;Transitional Care Facility   PT Rationale for DC Rec Patient appears to be below his prior level of function, reportedly was IND/mod I using 4WW.  Patient reporting resolution of speech changes and left-sided numbness; however, seems that have some conitnued confusion.  Patient lives in [facility] with wife, son, and daughter-in-law.  States she also has daily assist from daughter, who is a retired nurse.  Currently requiring Ax1 for transfers, gait, and stairs.  Does seems to be at increased risk of falls due to impulsivity and impaired balance, recommend continued use of 4WW.  HHPT indicated to address remaining strength, balance, and  endurance deficits.  Recommend TCU if family unable to assist at discharge.   PT Brief overview of current status Ax1 FWW, impulsive   PT Equipment Needed at Discharge   (Owns a 4WW)   Total Session Time   Timed Code Treatment Minutes 31   Total Session Time (sum of timed and untimed services) 44     [facility]  OUTPATIENT PHYSICAL THERAPY EVALUATION  PLAN OF TREATMENT FOR OUTPATIENT REHABILITATION  (COMPLETE FOR INITIAL CLAIMS ONLY)  Patient's Last Name, First Name, M.I.  YOB: 1928  Berhane Ely                        Provider's Name  [facility] Medical Record No.  [id number]                             Onset Date:  10/06/24   Start of Care Date:  10/07/24   Type:     _X_PT   ___OT   ___SLP Medical Diagnosis:  TIA              PT Diagnosis:  Impaired functional mobility Visits from SOC:  1     See note for plan of treatment, functional goals and certification details    I CERTIFY THE NEED FOR THESE SERVICES FURNISHED UNDER        THIS PLAN OF TREATMENT AND WHILE UNDER MY CARE     (Physician co-signature of this document indicates review and certification of the therapy plan).

## 2024-10-07 NOTE — PLAN OF CARE
Patient's After Visit Summary was reviewed with patient and/or Daughter  Patient verbalized understanding of After Visit Summary, recommended follow up and was given an opportunity to ask questions.   Discharge medications sent home with patient/family: Not applicable   Discharged with daughter        Goal Outcome Evaluation:      Plan of Care Reviewed With: patient    Overall Patient Progress: improvingOverall Patient Progress: improving    Outcome Evaluation: pt A/O X4, Daughter at bedside will be taking home. IV removed. To have home care. D/C paperwork prvodied.    OBSERVATION patient END time:1600    Problem: Adult Inpatient Plan of Care  Goal: Plan of Care Review  Description: The Plan of Care Review/Shift note should be completed every shift.  The Outcome Evaluation is a brief statement about your assessment that the patient is improving, declining, or no change.  This information will be displayed automatically on your shift  note.  10/7/2024 1559 by Sindhu Ruth RN  Outcome: Met  Flowsheets (Taken 10/7/2024 1559)  Outcome Evaluation: pt A/O X4, Daughter at bedside will be taking home. IV removed. To have home care. D/C paperwork prvodied.  Plan of Care Reviewed With: patient  Overall Patient Progress: improving  10/7/2024 1116 by Sindhu Ruth RN  Outcome: Progressing  Flowsheets (Taken 10/7/2024 1116)  Outcome Evaluation: PT a/o x4, family at bedside showing increased agitiation towards staff. Very [place]. Tele/stroke workup done, MRI done. Intemrittent confusion and forgetfulness. Tele SR 82. Fall precuations in palce due to hx of getting up OOB w/out notifing staff, urinal also provided at bedside.  Plan of Care Reviewed With: patient  Overall Patient Progress: improving  10/7/2024 1105 by Sindhu Ruth RN  Outcome: Progressing  Flowsheets (Taken 10/7/2024 1105)  Outcome Evaluation: PT a/o x4 w/intermittent confusion/forgetfulness. Very [place]. Tele/stroke work up done, Q.4 hr neuro done, WDL.  "Tele SR 82. PT is A1 w RWW and GB. Fall precautions in place due to hx and shakiness. PT is stilll getting up and OOB w/out notifing staff. Fall precautions in place. Urinal at bedside as well. MRI done of brain. PT daughter at bedside, showing agitaition towards staff.  Plan of Care Reviewed With: patient  Overall Patient Progress: improving  Goal: Patient-Specific Goal (Individualized)  Description: You can add care plan individualizations to a care plan. Examples of Individualization might be:  \"Parent requests to be called daily at 9am for status\", \"I have a hard time hearing out of my right ear\", or \"Do not touch me to wake me up as it startles  me\".  10/7/2024 1559 by Sindhu Ruth RN  Outcome: Met  10/7/2024 1116 by Sindhu Ruth RN  Outcome: Progressing  10/7/2024 1105 by Sindhu Ruth RN  Outcome: Progressing  Goal: Absence of Hospital-Acquired Illness or Injury  10/7/2024 1559 by Sindhu Ruth RN  Outcome: Met  10/7/2024 1116 by Sindhu Ruth RN  Outcome: Progressing  10/7/2024 1105 by Sindhu Ruth RN  Outcome: Progressing  Intervention: Identify and Manage Fall Risk  Recent Flowsheet Documentation  Taken 10/7/2024 0800 by Sindhu Ruth RN  Safety Promotion/Fall Prevention: safety round/check completed  Intervention: Prevent Skin Injury  Recent Flowsheet Documentation  Taken 10/7/2024 0800 by Sindhu Ruth RN  Device Skin Pressure Protection:   absorbent pad utilized/changed   adhesive use limited  Intervention: Prevent Infection  Recent Flowsheet Documentation  Taken 10/7/2024 0800 by Sindhu Ruth RN  Infection Prevention:   cohorting utilized   rest/sleep promoted   equipment surfaces disinfected   hand hygiene promoted  Goal: Optimal Comfort and Wellbeing  10/7/2024 1559 by Sindhu Ruth RN  Outcome: Met  10/7/2024 1116 by Sindhu Ruth RN  Outcome: Progressing  10/7/2024 1105 by Sindhu Ruth RN  Outcome: Progressing  Goal: Readiness for Transition of " Care  10/7/2024 1559 by Sindhu Ruth RN  Outcome: Met  10/7/2024 1116 by Sindhu Ruth RN  Outcome: Progressing  10/7/2024 1105 by Sindhu Ruth, RN  Outcome: Progressing

## 2024-10-07 NOTE — PROGRESS NOTES
Family in room was caught getting PT oob without calling for staff assistance. PT family in room notified of fall precautions and to call before getting up OOB. Pt informed as well of fall precaution practice and use of GB, FWW, and Bed alarm usage, and call light.. pt agreed to utilize.

## 2024-10-07 NOTE — CONSULTS
"[facility]    Stroke Telephone Note    I was called by Gerardo Herman on 10/06/24 regarding patient Berhane Ely. The patient is a 89 year old male with transient episode of  confusion.  Please refer to note from my colleague for details. The plan was to obtain US carotid given CTA reporting high grade stenosis.     US carotid reported back <50% carotid stenosis    Vitals  BP: (!) 148/88   Pulse: 73   Resp: 20   Temp: 97.4  F (36.3  C)   Weight: 75.3 kg (166 lb 0.1 oz)        Impression  TIA    Recommendations  Pt can be admitted at [facility]   Echo  LDL HbA1c  Q4 hour neurochecks  Antithrombotic regimen per Dr Roblero attestation of the note form earlier today.      My recommendations are based on the information provided over the phone by Berhane Ely's in-person providers. They are not intended to replace the clinical judgment of his in-person providers. I was not requested to personally see or examine the patient at this time.     Nela Prado MD  Vascular Neurology    To page me or covering stroke neurology team member, click here: AMCOM  Choose \"On Call\" tab at top, then select \"NEUROLOGY/ALL SITES\" from middle drop-down box, press Enter, then look for \"stroke\" or \"telestroke\" for your site.         "

## 2024-10-07 NOTE — PLAN OF CARE
PRIMARY DIAGNOSIS TIA  OUTPATIENT/OBSERVATION GOALS TO BE MET BEFORE DISCHARGE:  1. Orthostatic performed: No    2. Diagnostic testing complete & at baseline neurologic testing: Yes    3. Cleared by consultants (if involved): [place].    4. Interpretation of cardiac rhythm per telemetry tech: SR 82    5. Tolerating adequate PO diet and medications: Yes    6. Return to near baseline physical activity or neurologic status: Yes    Discharge Planner Nurse   Safe discharge environment identified: Yes  Barriers to discharge: Yes       Entered by: Sindhu Ruth RN 10/07/2024 11:17 AM     Please review provider order for any additional goals.   Nurse to notify provider when observation goals have been met and patient is ready for discharge.    Goal Outcome Evaluation:      Plan of Care Reviewed With: patient    Overall Patient Progress: improvingOverall Patient Progress: improving    Outcome Evaluation: PT a/o x4, family at bedside showing increased agitiation towards staff. Very [place]. Tele/stroke workup done, MRI done. Intemrittent confusion and forgetfulness. Tele SR 82. Fall precuations in palce due to hx of getting up OOB w/out notifing staff, urinal also provided at bedside. Neuro Q.4 looks WDL. Pt has chronic molecular degeneration of Left eye.      Problem: Adult Inpatient Plan of Care  Goal: Plan of Care Review  Description: The Plan of Care Review/Shift note should be completed every shift.  The Outcome Evaluation is a brief statement about your assessment that the patient is improving, declining, or no change.  This information will be displayed automatically on your shift  note.  10/7/2024 1116 by Sindhu Ruth RN  Outcome: Progressing  Flowsheets (Taken 10/7/2024 1116)  Outcome Evaluation: PT a/o x4, family at bedside showing increased agitiation towards staff. Very [place]. Tele/stroke workup done, MRI done. Intemrittent confusion and forgetfulness. Tele SR 82. Fall precuations in palce due to hx of getting up  "OOB w/out notifing staff, urinal also provided at bedside.  Plan of Care Reviewed With: patient  Overall Patient Progress: improving  10/7/2024 1105 by Sindhu Ruth RN  Outcome: Progressing  Flowsheets (Taken 10/7/2024 1105)  Outcome Evaluation: PT a/o x4 w/intermittent confusion/forgetfulness. Very [place]. Tele/stroke work up done, Q.4 hr neuro done, WDL. Tele SR 82. PT is A1 w RWW and GB. Fall precautions in place due to hx and shakiness. PT is stilll getting up and OOB w/out notifing staff. Fall precautions in place. Urinal at bedside as well. MRI done of brain. PT daughter at bedside, showing agitaition towards staff.  Plan of Care Reviewed With: patient  Overall Patient Progress: improving  Goal: Patient-Specific Goal (Individualized)  Description: You can add care plan individualizations to a care plan. Examples of Individualization might be:  \"Parent requests to be called daily at 9am for status\", \"I have a hard time hearing out of my right ear\", or \"Do not touch me to wake me up as it startles  me\".  10/7/2024 1116 by Sindhu Ruth RN  Outcome: Progressing  10/7/2024 1105 by Sindhu Ruth RN  Outcome: Progressing  Goal: Absence of Hospital-Acquired Illness or Injury  10/7/2024 1116 by Sindhu Ruth RN  Outcome: Progressing  10/7/2024 1105 by Sindhu Ruth RN  Outcome: Progressing  Intervention: Identify and Manage Fall Risk  Recent Flowsheet Documentation  Taken 10/7/2024 0800 by Sindhu Ruth RN  Safety Promotion/Fall Prevention: safety round/check completed  Intervention: Prevent Skin Injury  Recent Flowsheet Documentation  Taken 10/7/2024 0800 by Sindhu Ruth RN  Device Skin Pressure Protection:   absorbent pad utilized/changed   adhesive use limited  Intervention: Prevent Infection  Recent Flowsheet Documentation  Taken 10/7/2024 0800 by Sindhu Ruth RN  Infection Prevention:   cohorting utilized   rest/sleep promoted   equipment surfaces disinfected   hand hygiene " promoted  Goal: Optimal Comfort and Wellbeing  10/7/2024 1116 by Sindhu Ruth RN  Outcome: Progressing  10/7/2024 1105 by Sindhu Ruth RN  Outcome: Progressing  Goal: Readiness for Transition of Care  10/7/2024 1116 by Sindhu Ruth RN  Outcome: Progressing  10/7/2024 1105 by Sindhu Ruth RN  Outcome: Progressing

## 2024-10-07 NOTE — PLAN OF CARE
PRIMARY DIAGNOSIS: TIA  OUTPATIENT/OBSERVATION GOALS TO BE MET BEFORE DISCHARGE:  1. Orthostatic performed: No    2. Diagnostic testing complete & at baseline neurologic testing: Yes    3. Cleared by consultants (if involved): No    4. Interpretation of cardiac rhythm per telemetry tech: SB/PACs/HR 50's    5. Tolerating adequate PO diet and medications: Yes    6. Return to near baseline physical activity or neurologic status: No    Discharge Planner Nurse   Safe discharge environment identified: Yes  Barriers to discharge: Yes       Entered by: AKIN DIANA RN 10/07/2024 4:43 AM   Vital signs:  Temp: 98.1  F (36.7  C) Temp src: Oral BP: (!) 164/68 Pulse: 66   Resp: 18 SpO2: 96 % O2 Device: None (Room air)     Please review provider order for any additional goals.   Nurse to notify provider when observation goals have been met and patient is ready for discharge.Goal Outcome Evaluation:      Plan of Care Reviewed With: patient, child    Overall Patient Progress: improvingOverall Patient Progress: improving    Outcome Evaluation: symptoms resolved. A&O x4 but forgetful. [place].  Alert and oriented x4. Very forgetful. [place]. Denies pain, dizziness, nausea, vomiting. Passed bedside dysphagia screen. Water and apple sauce given. On regular diet. IV saline locked. Neuro intact. 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 Assist of x1 with walker and gait belt. Unsteady on feet. Attempted getting out of bed with out calling for help. Remind to use call light. Neurology following. PT, OT consulted. Plan to continue Neuro check. Bed alarm on. Call light in reach.    Problem: Adult Inpatient Plan of Care  Goal: Plan of Care Review  Description: The Plan of Care Review/Shift note should be completed every shift.  The Outcome Evaluation is a brief statement about your assessment that the patient is improving, declining, or no change.  This information will be displayed automatically on your  "shift  note.  Outcome: Progressing  Flowsheets (Taken 10/7/2024 0442)  Outcome Evaluation: symptoms resolved. A&O x4 but forgetful. [place].  Plan of Care Reviewed With:   patient   child  Overall Patient Progress: improving  Goal: Patient-Specific Goal (Individualized)  Description: You can add care plan individualizations to a care plan. Examples of Individualization might be:  \"Parent requests to be called daily at 9am for status\", \"I have a hard time hearing out of my right ear\", or \"Do not touch me to wake me up as it startles  me\".  Outcome: Progressing  Goal: Absence of Hospital-Acquired Illness or Injury  Outcome: Progressing  Intervention: Identify and Manage Fall Risk  Recent Flowsheet Documentation  Taken 10/7/2024 0138 by Bk Martin, RN  Safety Promotion/Fall Prevention:   activity supervised   clutter free environment maintained   increased rounding and observation   mobility aid in reach   nonskid shoes/slippers when out of bed   room organization consistent   safety round/check completed  Goal: Optimal Comfort and Wellbeing  Outcome: Progressing  Goal: Readiness for Transition of Care  Outcome: Progressing        "

## 2024-10-07 NOTE — H&P
[facility]    History and Physical - Hospitalist Service       Date of Admission:  10/6/2024    Assessment & Plan      Berhane Ely is a 89 year old male admitted on 10/6/2024. He has a history of polymyalgia rheumatica, hypertension, hyperlipidemia who presents to the ED for evaluation of confusion and speech changes at home.  Symptoms first noted by family day prior to admission.  Had another episode day of admission.  Stroke neurology consulted, concern for TIA, being admitted observation, MRI brain ordered.    Possible TIA versus transient focal neurologic episode  Confusion  Dysphagia  Family first noted symptoms day prior to admission.  Was confused, had difficulty speaking, bumping into things with his walker.  Also noted to have some left upper extremity numbness.  Symptoms resolved spontaneously, had another episode that last about 60 minutes day of admission.  Brought to the ED for evaluation.  Symptoms mostly resolved by the time patient got to ED.  Stroke neurology consulted.  CT and CTA, brain MRI of the head and neck were unrevealing for acute evidence of stroke. Concern for TIA versus transient focal neurologic episode.  Admitted to observation.  - Stroke neurology following, appreciate assistance  - Start ASA 81 mg daily, s/p Plavix loading 300 mg, continue 75 mg daily  - TTE, lipid panel, A1c ordered  - ESR, CRP ordered    Hypertension  Hyperlipidemia  PTA medications per chart review include amlodipine, losartan.  Will restart once verified by pharmacy.    Polymyalgia rheumatica  Follows with rheumatology clinic.  Long-term steroid use, but currently fully tapered off of prednisone.  Follow-up ESR, CRP.  - Follow-up ESR, CRP    Osteoporosis  Noted per chart review.           Observation Goals: -diagnostic tests and consults completed and resulted, -vital signs normal or at patient baseline, -returns to baseline functional status, Nurse to notify provider when observation  "goals have been met and patient is ready for discharge.  Diet: Regular Diet Adult  DVT Prophylaxis: Low Risk/Ambulatory with no VTE prophylaxis indicated  Cohn Catheter: Not present  Lines: None     Cardiac Monitoring: ACTIVE order. Indication: Stroke, acute (48 hours)  Code Status:  Full code    Clinically Significant Risk Factors Present on Admission         # Hyponatremia: Lowest Na = 133 mmol/L in last 2 days, will monitor as appropriate             # Anemia: based on hgb <11       # Overweight: Estimated body mass index is 25.08 kg/m  as calculated from the following:    Height as of this encounter: 1.702 m (5' 7\").    Weight as of this encounter: 72.6 kg (160 lb 1.6 oz).              Disposition Plan     Medically Ready for Discharge: Anticipated Tomorrow           Mark Bonner MD  Hospitalist Service  [facility]  Securely message with [employer] (more info)  Text page via [facility] Paging/Directory     ______________________________________________________________________    Chief Complaint   Confusion, difficulty speaking    History is obtained from the patient    History of Present Illness   Berhane Ely is a 89 year old male who has a history of polymyalgia rheumatica, hypertension, hyperlipidemia who presents to the ED for evaluation of confusion and speech changes at home patient reports first episode was day prior to admission.  Was confused, having trouble speaking, bumping into things with his walker which is.  Unusual for the patient.  Symptoms self resolved, but had another episode with similar symptoms morning of admission.  Symptoms lasted about 60 minutes, resolved before getting to the emergency department.  Has not had symptoms like this in the past, only these 2 episodes have been noted per the family.  Appears pretty much at his baseline on arrival to the ED.  Denies any recent illness symptoms.    Vital signs stable with some mild hypertension on arrival to the ED.  Labs " relatively unremarkable.  Stroke neurology consulted given presentation, CT of the head and neck without evidence of CVA.  MRI of the brain ordered.  Stroke neurology recommending observation admission.      Past Medical History    No past medical history on file.    Past Surgical History   No past surgical history on file.    Prior to Admission Medications   None        Review of Systems    The 10 point Review of Systems is negative other than noted in the HPI or here.      Physical Exam   Vital Signs: Temp: 97.7  F (36.5  C) Temp src: Oral BP: 138/60 Pulse: 66   Resp: 18 SpO2: 99 % O2 Device: None (Room air)    Weight: 160 lbs 1.6 oz    Constitutional: Well appearing, lying in bed  Respiratory: Lungs clear, normal work of breathing  Cardiovascular: RRR, no MRGs  GI: Abdomen soft, non tender, non-distended  Neurologic: Hard of hearing but alert, answering questions when prompted, some confusion but overall engaged well. No focal deficits appreciated, bilateral upper and lower extremity strength 5/5    Medical Decision Making       65 MINUTES SPENT BY ME on the date of service doing chart review, history, exam, documentation & further activities per the note.      Data     I have personally reviewed the following data over the past 24 hrs:    8.2  \   10.9 (L)   / 244     137 102 20.5 /  109 (H)   4.0 22 0.97 \     Trop: 26 (H) BNP: N/A     TSH: N/A T4: N/A A1C: 5.7 (H)     Procal: N/A CRP: 14.61 (H) Lactic Acid: N/A       INR:  1.03 PTT:  28   D-dimer:  N/A Fibrinogen:  N/A       Imaging results reviewed over the past 24 hrs:   Recent Results (from the past 24 hour(s))   CT Head w/o Contrast    Narrative    EXAM: CTA HEAD NECK W CONTRAST, CT HEAD W/O CONTRAST  LOCATION: [facility]  DATE: 10/6/2024    INDICATION: Acute neuro deficit, stroke TIA suspected. Confusion, left arm numbness  COMPARISON: None.  CONTRAST: 67 mL isovue 370  TECHNIQUE: Head and neck CT angiogram with IV contrast.  Noncontrast head CT followed by axial helical CT images of the head and neck vessels obtained during the arterial phase of intravenous contrast administration. Axial 2D reconstructed images and   multiplanar 3D MIP reconstructed images of the head and neck vessels were performed by the technologist. Dose reduction techniques were used. All stenosis measurements made according to NASCET criteria unless otherwise specified.    FINDINGS:   NONCONTRAST HEAD CT:   INTRACRANIAL CONTENTS: No intracranial hemorrhage, extraaxial collection, or mass effect.  No CT evidence of acute infarct. Mild presumed chronic small vessel ischemic changes. Mild generalized volume loss. No hydrocephalus. Intracranial atherosclerotic   calcifications.    VISUALIZED ORBITS/SINUSES/MASTOIDS: Prior bilateral cataract surgery. Visualized portions of the orbits are otherwise unremarkable. No paranasal sinus mucosal disease. No middle ear or mastoid effusion.    BONES/SOFT TISSUES: No acute abnormality.    HEAD CTA:  ANTERIOR CIRCULATION: Bilateral carotid siphon atherosclerotic calcifications. No stenosis/occlusion, aneurysm, or high flow vascular malformation. Standard [place] anatomy.    POSTERIOR CIRCULATION: No stenosis/occlusion, aneurysm, or high flow vascular malformation. Balanced vertebral arteries supply a normal basilar artery.     DURAL VENOUS SINUSES: Expected enhancement of the major dural venous sinuses.    NECK CTA:  RIGHT CAROTID: No measurable stenosis or dissection.    LEFT CAROTID: Atherosclerotic plaque results in critical, approximately 90%, stenosis in the left ICA. No dissection.    VERTEBRAL ARTERIES: Scattered calcifications throughout the left vertebral artery. No focal stenosis or dissection. Balanced vertebral arteries.    AORTIC ARCH: Bovine origin left common carotid artery. No significant stenosis at the origin of the great vessels.    NONVASCULAR STRUCTURES: Subcentimeter right thyroid nodule; no further  follow-up is required per size criteria. Minimal scattered emphysematous changes throughout both lungs. No suspicious pulmonary nodule. Multilevel cervical spondylosis. Neural   foraminal narrowing is greatest/severe at C5-C6 on the right. No high-grade canal stenosis.      Impression    IMPRESSION:   HEAD CT:  1.  No CT evidence for acute intracranial process.  2.  Brain atrophy and presumed chronic microvascular ischemic changes as above.    HEAD CTA:   1.  No large vessel occlusion or hemodynamically significant stenosis throughout the [place] arteries.    NECK CTA:  1.  High-grade approximately 90% stenosis in the proximal left internal carotid artery. Otherwise, no hemodynamically significant stenosis in the neck vessels.   2.  No evidence for dissection.   CTA Head Neck with Contrast    Narrative    EXAM: CTA HEAD NECK W CONTRAST, CT HEAD W/O CONTRAST  LOCATION: [facility]  DATE: 10/6/2024    INDICATION: Acute neuro deficit, stroke TIA suspected. Confusion, left arm numbness  COMPARISON: None.  CONTRAST: 67 mL isovue 370  TECHNIQUE: Head and neck CT angiogram with IV contrast. Noncontrast head CT followed by axial helical CT images of the head and neck vessels obtained during the arterial phase of intravenous contrast administration. Axial 2D reconstructed images and   multiplanar 3D MIP reconstructed images of the head and neck vessels were performed by the technologist. Dose reduction techniques were used. All stenosis measurements made according to NASCET criteria unless otherwise specified.    FINDINGS:   NONCONTRAST HEAD CT:   INTRACRANIAL CONTENTS: No intracranial hemorrhage, extraaxial collection, or mass effect.  No CT evidence of acute infarct. Mild presumed chronic small vessel ischemic changes. Mild generalized volume loss. No hydrocephalus. Intracranial atherosclerotic   calcifications.    VISUALIZED ORBITS/SINUSES/MASTOIDS: Prior bilateral cataract surgery.  Visualized portions of the orbits are otherwise unremarkable. No paranasal sinus mucosal disease. No middle ear or mastoid effusion.    BONES/SOFT TISSUES: No acute abnormality.    HEAD CTA:  ANTERIOR CIRCULATION: Bilateral carotid siphon atherosclerotic calcifications. No stenosis/occlusion, aneurysm, or high flow vascular malformation. Standard [place] anatomy.    POSTERIOR CIRCULATION: No stenosis/occlusion, aneurysm, or high flow vascular malformation. Balanced vertebral arteries supply a normal basilar artery.     DURAL VENOUS SINUSES: Expected enhancement of the major dural venous sinuses.    NECK CTA:  RIGHT CAROTID: No measurable stenosis or dissection.    LEFT CAROTID: Atherosclerotic plaque results in critical, approximately 90%, stenosis in the left ICA. No dissection.    VERTEBRAL ARTERIES: Scattered calcifications throughout the left vertebral artery. No focal stenosis or dissection. Balanced vertebral arteries.    AORTIC ARCH: Bovine origin left common carotid artery. No significant stenosis at the origin of the great vessels.    NONVASCULAR STRUCTURES: Subcentimeter right thyroid nodule; no further follow-up is required per size criteria. Minimal scattered emphysematous changes throughout both lungs. No suspicious pulmonary nodule. Multilevel cervical spondylosis. Neural   foraminal narrowing is greatest/severe at C5-C6 on the right. No high-grade canal stenosis.      Impression    IMPRESSION:   HEAD CT:  1.  No CT evidence for acute intracranial process.  2.  Brain atrophy and presumed chronic microvascular ischemic changes as above.    HEAD CTA:   1.  No large vessel occlusion or hemodynamically significant stenosis throughout the [place] arteries.    NECK CTA:  1.  High-grade approximately 90% stenosis in the proximal left internal carotid artery. Otherwise, no hemodynamically significant stenosis in the neck vessels.   2.  No evidence for dissection.   MR Brain w/o & w Contrast     Narrative    EXAM: MR BRAIN W/O and W CONTRAST  LOCATION: [facility]  DATE: 10/6/2024    INDICATION: Concern for TIA  COMPARISON: October 6, 2024.  CONTRAST: 8mL Gadavist  TECHNIQUE: Routine multiplanar multisequence head MRI without and with intravenous contrast.    FINDINGS:  INTRACRANIAL CONTENTS: No acute or subacute infarct. No mass, acute hemorrhage, or extra-axial fluid collections. Scattered nonspecific T2/FLAIR hyperintensities within the cerebral white matter most consistent with mild chronic microvascular ischemic   change. Mild generalized cerebral atrophy. No hydrocephalus. Normal position of the cerebellar tonsils. Gradient susceptibility is present in the right parietal lobe where there is corresponding subtle asymmetric enhancement which appears to represent   small vessels along the superficial aspect of the parietal lobe. Extensive FLAIR signal abnormality is present in the CSF spaces of this region without corresponding high diffusion signal, T1 hyperintense signal or evidence of hemorrhage on CT. Findings   are nonspecific though likely represent some sort of a benign vascular lesion. Surrounding FLAIR hyperintensity could represent mild vascular congestion, less likely subarachnoid hemorrhage given lack of corresponding high diffusion signal, T1   hyperintense signal and recent negative head CT. Additional considerations would include an exudate from an infectious or inflammatory process though would expect to see corresponding increased diffusion signal. There is otherwise no pathologic contrast   enhancement elsewhere within the brain.    SELLA: No abnormality accounting for technique.    OSSEOUS STRUCTURES/SOFT TISSUES: Normal marrow signal. The major intracranial vascular flow voids are maintained.     ORBITS: No abnormality accounting for technique.     SINUSES/MASTOIDS: No paranasal sinus mucosal disease. No middle ear or mastoid effusion.       Impression     IMPRESSION:  1.  No acute or subacute ischemic change.  2.  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  3.  FLAIR signal abnormality in the CSF spaces about the right parietal lobe, nonspecific, and favored to represent mild vascular congestion from the subjacent presumed vascular malformation. Additional considerations would include subarachnoid   hemorrhage, which is felt to be less likely (see discussion above), or an exudate from and infectious or inflammatory process. A follow-up head CT is recommended.   US Carotid Bilateral    Narrative    EXAM: US CAROTID BILATERAL  LOCATION: [facility]  DATE: 10/6/2024    INDICATION: Transient ischemia attack, Concern for carotid stenosis  COMPARISON: Same day CT angiogram neck  TECHNIQUE: Duplex exam performed utilizing 2D gray-scale imaging, Doppler interrogation with color-flow and spectral waveform analysis. The percent diameter stenosis is determined using Updated Recommendations for Carotid Stenosis Interpretation Criteria   from [employer].    FINDINGS:    RIGHT: Mild plaque at the bifurcation. The peak systolic velocity in the ICA is less than 180 cm/sec, consistent with less than 50% stenosis. Normal velocities in the ECA. Antegrade flow within the vertebral artery.     LEFT: Moderate plaque at the bifurcation. The peak systolic velocity in the ICA is less than 180 cm/sec, consistent with less than 50% stenosis. Normal velocities in the ECA. Antegrade flow within the vertebral artery.    VELOCITY CHART:  CCA   Right: 124/17 cm/s   Left: 129/19 cm/s  ICA   Right: 107/29 cm/s   Left: 125/30 cm/s  ECA   Right: 247/6 cm/s   Left: 158/3 cm/s  ICA/CCA PSV Ratio   Right: 1.2   Left: 1.0      Impression    IMPRESSION:  1.  Mild plaque formation, velocities consistent with less than 50% stenosis in the right internal carotid artery.  2.  Moderate plaque formation  at the carotid bulb, although velocities consistent with less than 50% stenosis in the left internal carotid artery.  3.  Flow within the vertebral arteries is antegrade.

## 2024-10-07 NOTE — DISCHARGE SUMMARY
"[facility]  Hospitalist Discharge Summary      Date of Admission:  10/6/2024  Date of Discharge:  10/7/2024  4:00 PM  Discharging Provider: Swati Anderson DO  Discharge Service: Hospitalist Service    Discharge Diagnoses   Possible TIA versus transient focal neurologic episode  Confusion  Dysphagia  Hypertension  Hyperlipidemia  Polymyalgia rheumatica  Osteoporosis    Clinically Significant Risk Factors     # Overweight: Estimated body mass index is 25.08 kg/m  as calculated from the following:    Height as of this encounter: 1.702 m (5' 7\").    Weight as of this encounter: 72.6 kg (160 lb 1.6 oz).       Follow-ups Needed After Discharge   Follow-up Appointments     Follow-up and recommended labs and tests       Follow up with primary care provider, Dequan Collins, within 7 days for   hospital follow- up.      Follow up with stroke neurology in 6-8 weeks with a repeat MRI.            Discharge Disposition   Discharged to home  Condition at discharge: Stable    Hospital Course      Berhane Ely is a 89 year old male admitted on 10/6/2024. He has a history of polymyalgia rheumatica, hypertension, hyperlipidemia who presents to the ED for evaluation of confusion and speech changes at home.  Symptoms first noted by family day prior to admission.  Had another episode day of admission.  Stroke neurology consulted. MRI negative for stroke but showed possible subacute SAH. Neurology spoke with patient and daughter about transfer for further evaluation. His symptoms had resolved so patient and daughter declined transfer and discharged home with outpatient neuro follow up with MRI.     Possible TIA versus transient focal neurologic episode  Confusion  Dysphagia  MRI abnormality concerning for late subacute cortical subarachnoid hemorrhage  Family first noted symptoms day prior to admission.  Was confused, had difficulty speaking, bumping into things with his walker.  Also noted to have some left " upper extremity numbness.  Symptoms resolved spontaneously, had another episode that last about 60 minutes day of admission.  Brought to the ED for evaluation.  Symptoms mostly resolved by the time patient got to ED.  Stroke neurology consulted.  CT and CTA, brain MRI of the head and neck were unrevealing for acute evidence of stroke.   A1c 5.7. LDL 66. CRP 14.61. ESR 26. TTE with normal LVEF.  MRI showed R parietal lobe, FLAIR signal abnormality concern for late subacute cortical subarachnoid hemorrhage with contrast enhancement (hemosiderosis) versus amyloid inflammation versus other. Stroke neurology spoke with patient and daughter about potential transfer for further evaluation, but he was back to baseline so they opted to discharge home with outpatient follow up.   - Stop ASA  - Home PT    Hypertension  Hyperlipidemia  PTA amlodipine and losartan    Polymyalgia rheumatica  Follows with rheumatology clinic.  Long-term steroid use, but currently fully tapered off of prednisone.      Osteoporosis  Noted per chart review.      Consultations This Hospital Stay   PHYSICAL THERAPY ADULT IP CONSULT  OCCUPATIONAL THERAPY ADULT IP CONSULT    Code Status   No CPR- Do NOT Intubate    Time Spent on this Encounter   I, Swati Anderson DO, personally saw the patient today and spent greater than 30 minutes discharging this patient.       Swati Anderson DO  [facility] OBSERVATION DEPT  [address] [place] [address]  Phone: [phone]  ______________________________________________________________________    Physical Exam   Vital Signs: Temp: 98  F (36.7  C) Temp src: Oral BP: (!) 140/76 Pulse: 69   Resp: 18 SpO2: 97 % O2 Device: None (Room air)    Weight: 160 lbs 1.6 oz  Constitutional: Awake, alert, no distress, and cooperative  Cardiovascular: Regular rate and rhythm, normal S1 and S2, no S3 or S4, and no murmur noted  Respiratory: No increased work of breathing, good air exchange,  clear to auscultation bilaterally, no crackles or wheezing  Gastrointestinal: Abdomen soft, non-tender, non-distended. BS normal. No masses, organomegaly        Primary Care Physician   Dequan Collins    Discharge Orders      MR Brain w/o & w Contrast     Home Care Referral      Reason for your hospital stay    You were in the hospital for episode of confusion, altered speech, and numbness. There was no evidence of a stroke on MRI. This may have been a TIA/transient ischemic attack.   There was an abnormality on the brain MRI that may be a subarachnoid hemorrhage. It was not new, but neurology recommends to stop aspirin and get a repeat MRI in 6-8 weeks.     Follow-up and recommended labs and tests     Follow up with primary care provider, Dequan Collins, within 7 days for hospital follow- up.      Follow up with stroke neurology in 6-8 weeks with a repeat MRI.     Activity    Your activity upon discharge: activity as tolerated     Diet    Follow this diet upon discharge:       Regular Diet Adult     Stroke Hospital Follow Up (for neurologist use only)    [facility] will call you to coordinate care as prescribed by your provider. If you don t hear from a representative within 2 business days, please call [phone].         Significant Results and Procedures   Most Recent 3 CBC's:  Recent Labs   Lab Test 10/07/24  0540 10/06/24  1424   WBC 8.2 8.5   HGB 10.9* 11.1*   MCV 93 93    239     Most Recent 3 BMP's:  Recent Labs   Lab Test 10/07/24  0540 10/06/24  1424    133*   POTASSIUM 4.0 4.3   CHLORIDE 102 99   CO2 22 22   BUN 20.5 29.0*   CR 0.97 1.10   ANIONGAP 13 12   LIANET 8.6* 8.9   * 130*   ,   Results for orders placed or performed during the hospital encounter of 10/06/24   CT Head w/o Contrast    Narrative    EXAM: CTA HEAD NECK W CONTRAST, CT HEAD W/O CONTRAST  LOCATION: [facility]  DATE: 10/6/2024    INDICATION: Acute neuro deficit, stroke TIA suspected.  Confusion, left arm numbness  COMPARISON: None.  CONTRAST: 67 mL isovue 370  TECHNIQUE: Head and neck CT angiogram with IV contrast. Noncontrast head CT followed by axial helical CT images of the head and neck vessels obtained during the arterial phase of intravenous contrast administration. Axial 2D reconstructed images and   multiplanar 3D MIP reconstructed images of the head and neck vessels were performed by the technologist. Dose reduction techniques were used. All stenosis measurements made according to NASCET criteria unless otherwise specified.    FINDINGS:   NONCONTRAST HEAD CT:   INTRACRANIAL CONTENTS: No intracranial hemorrhage, extraaxial collection, or mass effect.  No CT evidence of acute infarct. Mild presumed chronic small vessel ischemic changes. Mild generalized volume loss. No hydrocephalus. Intracranial atherosclerotic   calcifications.    VISUALIZED ORBITS/SINUSES/MASTOIDS: Prior bilateral cataract surgery. Visualized portions of the orbits are otherwise unremarkable. No paranasal sinus mucosal disease. No middle ear or mastoid effusion.    BONES/SOFT TISSUES: No acute abnormality.    HEAD CTA:  ANTERIOR CIRCULATION: Bilateral carotid siphon atherosclerotic calcifications. No stenosis/occlusion, aneurysm, or high flow vascular malformation. Standard [place] anatomy.    POSTERIOR CIRCULATION: No stenosis/occlusion, aneurysm, or high flow vascular malformation. Balanced vertebral arteries supply a normal basilar artery.     DURAL VENOUS SINUSES: Expected enhancement of the major dural venous sinuses.    NECK CTA:  RIGHT CAROTID: No measurable stenosis or dissection.    LEFT CAROTID: Atherosclerotic plaque results in critical, approximately 90%, stenosis in the left ICA. No dissection.    VERTEBRAL ARTERIES: Scattered calcifications throughout the left vertebral artery. No focal stenosis or dissection. Balanced vertebral arteries.    AORTIC ARCH: Bovine origin left common carotid artery.  No significant stenosis at the origin of the great vessels.    NONVASCULAR STRUCTURES: Subcentimeter right thyroid nodule; no further follow-up is required per size criteria. Minimal scattered emphysematous changes throughout both lungs. No suspicious pulmonary nodule. Multilevel cervical spondylosis. Neural   foraminal narrowing is greatest/severe at C5-C6 on the right. No high-grade canal stenosis.      Impression    IMPRESSION:   HEAD CT:  1.  No CT evidence for acute intracranial process.  2.  Brain atrophy and presumed chronic microvascular ischemic changes as above.    HEAD CTA:   1.  No large vessel occlusion or hemodynamically significant stenosis throughout the [place] arteries.    NECK CTA:  1.  High-grade approximately 90% stenosis in the proximal left internal carotid artery. Otherwise, no hemodynamically significant stenosis in the neck vessels.   2.  No evidence for dissection.   CTA Head Neck with Contrast    Narrative    EXAM: CTA HEAD NECK W CONTRAST, CT HEAD W/O CONTRAST  LOCATION: [facility]  DATE: 10/6/2024    INDICATION: Acute neuro deficit, stroke TIA suspected. Confusion, left arm numbness  COMPARISON: None.  CONTRAST: 67 mL isovue 370  TECHNIQUE: Head and neck CT angiogram with IV contrast. Noncontrast head CT followed by axial helical CT images of the head and neck vessels obtained during the arterial phase of intravenous contrast administration. Axial 2D reconstructed images and   multiplanar 3D MIP reconstructed images of the head and neck vessels were performed by the technologist. Dose reduction techniques were used. All stenosis measurements made according to NASCET criteria unless otherwise specified.    FINDINGS:   NONCONTRAST HEAD CT:   INTRACRANIAL CONTENTS: No intracranial hemorrhage, extraaxial collection, or mass effect.  No CT evidence of acute infarct. Mild presumed chronic small vessel ischemic changes. Mild generalized volume loss. No  hydrocephalus. Intracranial atherosclerotic   calcifications.    VISUALIZED ORBITS/SINUSES/MASTOIDS: Prior bilateral cataract surgery. Visualized portions of the orbits are otherwise unremarkable. No paranasal sinus mucosal disease. No middle ear or mastoid effusion.    BONES/SOFT TISSUES: No acute abnormality.    HEAD CTA:  ANTERIOR CIRCULATION: Bilateral carotid siphon atherosclerotic calcifications. No stenosis/occlusion, aneurysm, or high flow vascular malformation. Standard [place] anatomy.    POSTERIOR CIRCULATION: No stenosis/occlusion, aneurysm, or high flow vascular malformation. Balanced vertebral arteries supply a normal basilar artery.     DURAL VENOUS SINUSES: Expected enhancement of the major dural venous sinuses.    NECK CTA:  RIGHT CAROTID: No measurable stenosis or dissection.    LEFT CAROTID: Atherosclerotic plaque results in critical, approximately 90%, stenosis in the left ICA. No dissection.    VERTEBRAL ARTERIES: Scattered calcifications throughout the left vertebral artery. No focal stenosis or dissection. Balanced vertebral arteries.    AORTIC ARCH: Bovine origin left common carotid artery. No significant stenosis at the origin of the great vessels.    NONVASCULAR STRUCTURES: Subcentimeter right thyroid nodule; no further follow-up is required per size criteria. Minimal scattered emphysematous changes throughout both lungs. No suspicious pulmonary nodule. Multilevel cervical spondylosis. Neural   foraminal narrowing is greatest/severe at C5-C6 on the right. No high-grade canal stenosis.      Impression    IMPRESSION:   HEAD CT:  1.  No CT evidence for acute intracranial process.  2.  Brain atrophy and presumed chronic microvascular ischemic changes as above.    HEAD CTA:   1.  No large vessel occlusion or hemodynamically significant stenosis throughout the [place] arteries.    NECK CTA:  1.  High-grade approximately 90% stenosis in the proximal left internal carotid artery.  Otherwise, no hemodynamically significant stenosis in the neck vessels.   2.  No evidence for dissection.   US Carotid Bilateral    Narrative    EXAM: US CAROTID BILATERAL  LOCATION: [facility]  DATE: 10/6/2024    INDICATION: Transient ischemia attack, Concern for carotid stenosis  COMPARISON: Same day CT angiogram neck  TECHNIQUE: Duplex exam performed utilizing 2D gray-scale imaging, Doppler interrogation with color-flow and spectral waveform analysis. The percent diameter stenosis is determined using Updated Recommendations for Carotid Stenosis Interpretation Criteria   from [employer] [employer].    FINDINGS:    RIGHT: Mild plaque at the bifurcation. The peak systolic velocity in the ICA is less than 180 cm/sec, consistent with less than 50% stenosis. Normal velocities in the ECA. Antegrade flow within the vertebral artery.     LEFT: Moderate plaque at the bifurcation. The peak systolic velocity in the ICA is less than 180 cm/sec, consistent with less than 50% stenosis. Normal velocities in the ECA. Antegrade flow within the vertebral artery.    VELOCITY CHART:  CCA   Right: 124/17 cm/s   Left: 129/19 cm/s  ICA   Right: 107/29 cm/s   Left: 125/30 cm/s  ECA   Right: 247/6 cm/s   Left: 158/3 cm/s  ICA/CCA PSV Ratio   Right: 1.2   Left: 1.0      Impression    IMPRESSION:  1.  Mild plaque formation, velocities consistent with less than 50% stenosis in the right internal carotid artery.  2.  Moderate plaque formation at the carotid bulb, although velocities consistent with less than 50% stenosis in the left internal carotid artery.  3.  Flow within the vertebral arteries is antegrade.   MR Brain w/o & w Contrast    Narrative    EXAM: MR BRAIN W/O and W CONTRAST  LOCATION: [facility]  DATE: 10/6/2024    INDICATION: Concern for TIA  COMPARISON: October 6, 2024.  CONTRAST: 8mL Gadavist  TECHNIQUE: Routine multiplanar multisequence head MRI without and with intravenous  contrast.    FINDINGS:  INTRACRANIAL CONTENTS: No acute or subacute infarct. No mass, acute hemorrhage, or extra-axial fluid collections. Scattered nonspecific T2/FLAIR hyperintensities within the cerebral white matter most consistent with mild chronic microvascular ischemic   change. Mild generalized cerebral atrophy. No hydrocephalus. Normal position of the cerebellar tonsils. Gradient susceptibility is present in the right parietal lobe where there is corresponding subtle asymmetric enhancement which appears to represent   small vessels along the superficial aspect of the parietal lobe. Extensive FLAIR signal abnormality is present in the CSF spaces of this region without corresponding high diffusion signal, T1 hyperintense signal or evidence of hemorrhage on CT. Findings   are nonspecific though likely represent some sort of a benign vascular lesion. Surrounding FLAIR hyperintensity could represent mild vascular congestion, less likely subarachnoid hemorrhage given lack of corresponding high diffusion signal, T1   hyperintense signal and recent negative head CT. Additional considerations would include an exudate from an infectious or inflammatory process though would expect to see corresponding increased diffusion signal. There is otherwise no pathologic contrast   enhancement elsewhere within the brain.    SELLA: No abnormality accounting for technique.    OSSEOUS STRUCTURES/SOFT TISSUES: Normal marrow signal. The major intracranial vascular flow voids are maintained.     ORBITS: No abnormality accounting for technique.     SINUSES/MASTOIDS: No paranasal sinus mucosal disease. No middle ear or mastoid effusion.       Impression    IMPRESSION:  1.  No acute or subacute ischemic change.  2.  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  3.  FLAIR signal abnormality in the CSF spaces about the right parietal lobe, nonspecific, and favored  to represent mild vascular congestion from the subjacent presumed vascular malformation. Additional considerations would include subarachnoid   hemorrhage, which is felt to be less likely (see discussion above), or an exudate from and infectious or inflammatory process. A follow-up head CT is recommended.   MR Brain w/o Contrast    Narrative    MR BRAIN W/O CONTRAST 10/7/2024 10:45 AM    Provided History: Concern for TIA.    Comparison:  Brain MRI from yesterday     Technique: Only axial SWI images were obtained as an adjunct to MRI  from yesterday      Impression    Impression:   Sulcal linear hypodensities deposition in the right frontal and  parietal lobe, representing superficial hemosiderosis or prominent  cortical veins or combination of both. Prominent susceptibility in  bilateral basal ganglia, representing accelerated mineralization.  Scattered foci of susceptibility in bilateral cerebral spheres,  representing old microhemorrhages.         DELMY DELUCA MD         SYSTEM ID:  [id number]   Echocardiogram Complete     Value    LVEF  60-65%    Narrative    [id number]  [id number]  [id number]  [id number]^LORENA^BEAU     [facility]  Echocardiography Laboratory  [address] [place] [address]     Name: BRYANNA PRICE  MRN: [id number]  : 1928  Study Date: 10/07/2024 06:59 AM  Age: 89 yrs  Gender: Male  Patient Location: [facility]  Reason For Study: TIA  Ordering Physician: ALBER CARRILLO  Performed By: Kaylene Bae     BSA: 1.9 m2  Height: 67 in  Weight: 166 lb  HR: 72  BP: 148/88 mmHg  ______________________________________________________________________________  Procedure  Complete Portable Echo Adult.  ______________________________________________________________________________  Interpretation Summary     The visual ejection fraction is 60-65%.  Left ventricular systolic function is normal.  There is moderate (2+) mitral regurgitation.  Right ventricular systolic pressure is  elevated, consistent with moderate  pulmonary hypertension.  Mild valvular aortic stenosis.  ______________________________________________________________________________  Left Ventricle  The left ventricle is normal in size. There is normal left ventricular wall  thickness. Diastolic Doppler findings (E/E' ratio and/or other parameters)  suggest left ventricular filling pressures are indeterminate. The visual  ejection fraction is 60-65%. Left ventricular systolic function is normal.  Grade III or advanced diastolic dysfunction.     Right Ventricle  The right ventricle is normal in size and function.     Atria  The left atrium is moderately dilated. Right atrial size is normal. There is  no color Doppler evidence of an atrial shunt.     Mitral Valve  There is mild mitral annular calcification. The mitral papillary muscle  appears thickened and/or calcified. The mitral valve leaflets are mildly  thickened. There is moderate (2+) mitral regurgitation.     Tricuspid Valve  There is mild (1+) tricuspid regurgitation. The right ventricular systolic  pressure is approximated at 41.7 mmHg plus the right atrial pressure. IVC  diameter and respiratory changes fall into an intermediate range suggesting an  RA pressure of 8 mmHg. Right ventricular systolic pressure is elevated,  consistent with moderate pulmonary hypertension.     Aortic Valve  The aortic valve is trileaflet. Thickened aortic valve leaflets. No aortic  regurgitation is present. The calculated aortic valve are is 1.7 cm^2. The  peak AoV pressure gradient is 21.6 mmHg. The mean AoV pressure gradient is  11.9 mmHg. Mild valvular aortic stenosis.     Pulmonic Valve  There is trace pulmonic valvular regurgitation.     Vessels  The aortic root is normal size. Normal size ascending aorta. IVC diameter and  respiratory changes fall into an intermediate range suggesting an RA pressure  of 8 mmHg.     Pericardium  There is no pericardial effusion.     Rhythm  Sinus  rhythm was noted.  ______________________________________________________________________________  MMode/2D Measurements & Calculations     IVSd: 1.0 cm  LVIDd: 4.2 cm  LVIDs: 2.7 cm  LVPWd: 1.1 cm  IVC diam: 1.8 cm  FS: 36.5 %  LV mass(C)d: 147.9 grams  LV mass(C)dI: 79.2 grams/m2  Ao root diam: 3.5 cm  asc Aorta Diam: 3.4 cm  LVOT diam: 2.1 cm  LVOT area: 3.4 cm2  Ao root diam index Ht(cm/m): 2.0  Ao root diam index BSA (cm/m2): 1.9  Asc Ao diam index BSA (cm/m2): 1.8  Asc Ao diam index Ht(cm/m): 2.0  LA Volume (BP): 80.7 ml     LA Volume Index (BP): 43.2 ml/m2  RV Base: 4.2 cm  RWT: 0.52     Doppler Measurements & Calculations  MV E max peter: 121.0 cm/sec  MV A max peter: 49.9 cm/sec  MV E/A: 2.4  MV max P.7 mmHg  MV mean P.8 mmHg  MV V2 VTI: 34.5 cm  MVA(VTI): 2.5 cm2  MV dec time: 0.17 sec  Ao V2 max: 232.6 cm/sec  Ao max P.6 mmHg  Ao V2 mean: 165.7 cm/sec  Ao mean P.9 mmHg  Ao V2 VTI: 52.5 cm  RAN(I,D): 1.7 cm2  RAN(V,D): 1.7 cm2  LV V1 max P.4 mmHg  LV V1 max: 116.0 cm/sec  LV V1 VTI: 25.6 cm  MR PISA: 0.29 cm2  MR ERO: 0.01 cm2  MR volume: 3.1 ml  SV(LVOT): 87.9 ml  SI(LVOT): 47.1 ml/m2  TR max peter: 321.5 cm/sec  TR max P.7 mmHg  AV Peter Ratio (DI): 0.50     RAN Index (cm2/m2): 0.90  E/E' av.5  Lateral E/e': 11.3  Medial E/e': 13.6     ______________________________________________________________________________  Report approved by: Debora Lord 10/07/2024 09:09 AM             Discharge Medications   Discharge Medication List as of 10/7/2024  3:38 PM        CONTINUE these medications which have NOT CHANGED    Details   acetaminophen (TYLENOL 8 HOUR) 650 MG CR tablet Take 650-1,300 mg by mouth every 8 hours., Historical      albuterol (PROAIR HFA/PROVENTIL HFA/VENTOLIN HFA) 108 (90 Base) MCG/ACT inhaler Inhale 2 puffs into the lungs every 4 hours as needed for shortness of breath, wheezing or cough., Historical      albuterol (PROVENTIL) (2.5 MG/3ML) 0.083% neb solution  Take 2.5 mg by nebulization every 4 hours as needed for shortness of breath, wheezing or cough., Historical      amLODIPine (NORVASC) 2.5 MG tablet Take 2.5 mg by mouth daily., Historical      calcium carbonate-vitamin D (OSCAL) 500-5 MG-MCG tablet Take 1 tablet by mouth daily., Historical      fluticasone (FLONASE) 50 MCG/ACT nasal spray Spray 1 spray into both nostrils daily as needed for rhinitis or allergies., Historical      loratadine (CLARITIN) 10 MG tablet Take 10 mg by mouth daily., Historical      losartan (COZAAR) 50 MG tablet Take 50 mg by mouth 2 times daily., Historical      Multiple Vitamins-Minerals (PRESERVISION AREDS 2 PO) Take 1 tablet by mouth 2 times daily., Historical      polyethylene glycol (MIRALAX) 17 g packet Take 17 g by mouth daily., Historical      simvastatin (ZOCOR) 20 MG tablet Take 20 mg by mouth at bedtime., Historical      terazosin (HYTRIN) 5 MG capsule Take 5 mg by mouth 2 times daily., Historical           STOP taking these medications       aspirin 81 MG EC tablet Comments:   Reason for Stopping:             Allergies   Allergies   Allergen Reactions    Penicillin G      Lip swelling    Bee Venom      Swelling    Tramadol Nausea and Vomiting

## 2024-10-07 NOTE — ED PROVIDER NOTES
Patient was changed over to my care from Dr. Merino to follow-up on MRI of the brain and carotid artery ultrasound.  MRI of the brain is without stroke.  Ultrasound reveals less than 50% carotid artery stenosis in opposition to CTA.  I spoke with stroke neurology who is comfortable with patient staying at [facility].  They recommended 81 mg of aspirin and 300 mg Plavix load.  Patient admitted to Dr. Bonner.     Gerardo Herman MD  10/06/24 [id number]

## 2024-10-07 NOTE — PROGRESS NOTES
PT notified me post session that he is notably more confused and impulsive throughout the afternoon and session. PT does have some concerns for balance due to chronic molecular degeneration of left eye. PT states family very much so wants him home and is open to home care with his daughter (at bedside [retired nurse is what she stated]) to help take care of him.    Provider notified

## 2024-10-07 NOTE — PLAN OF CARE
"Goal Outcome Evaluation:      Plan of Care Reviewed With: patient    Overall Patient Progress: improvingOverall Patient Progress: improving    Outcome Evaluation: PT a/o x4 w/intermittent confusion/forgetfulness. Very [place]. Tele/stroke work up done, Q.4 hr neuro done, WDL. Tele SR 82. PT is A1 w RWW and GB. Fall precautions in place due to hx and shakiness. PT is stilll getting up and OOB w/out notifing staff. Fall precautions in place. Urinal at bedside as well. MRI done of brain. PT daughter at bedside, showing agitaition towards staff.      Problem: Adult Inpatient Plan of Care  Goal: Plan of Care Review  Description: The Plan of Care Review/Shift note should be completed every shift.  The Outcome Evaluation is a brief statement about your assessment that the patient is improving, declining, or no change.  This information will be displayed automatically on your shift  note.  Outcome: Progressing  Flowsheets (Taken 10/7/2024 1105)  Outcome Evaluation: PT a/o x4 w/intermittent confusion/forgetfulness. Very [place]. Tele/stroke work up done, Q.4 hr neuro done, WDL. Tele SR 82. PT is A1 w RWW and GB. Fall precautions in place due to hx and shakiness. PT is stilll getting up and OOB w/out notifing staff. Fall precautions in place. Urinal at bedside as well. MRI done of brain. PT daughter at bedside, showing agitaition towards staff.  Plan of Care Reviewed With: patient  Overall Patient Progress: improving  Goal: Patient-Specific Goal (Individualized)  Description: You can add care plan individualizations to a care plan. Examples of Individualization might be:  \"Parent requests to be called daily at 9am for status\", \"I have a hard time hearing out of my right ear\", or \"Do not touch me to wake me up as it startles  me\".  Outcome: Progressing  Goal: Absence of Hospital-Acquired Illness or Injury  Outcome: Progressing  Intervention: Identify and Manage Fall Risk  Recent Flowsheet Documentation  Taken 10/7/2024 0800 by Faraz, " Sinduh FERNANDEZ RN  Safety Promotion/Fall Prevention: safety round/check completed  Intervention: Prevent Skin Injury  Recent Flowsheet Documentation  Taken 10/7/2024 0800 by Sindhu Ruth RN  Device Skin Pressure Protection:   absorbent pad utilized/changed   adhesive use limited  Intervention: Prevent Infection  Recent Flowsheet Documentation  Taken 10/7/2024 0800 by Sindhu Ruth RN  Infection Prevention:   cohorting utilized   rest/sleep promoted   equipment surfaces disinfected   hand hygiene promoted  Goal: Optimal Comfort and Wellbeing  Outcome: Progressing  Goal: Readiness for Transition of Care  Outcome: Progressing

## 2024-10-07 NOTE — PLAN OF CARE
[facility]    ED Boarding Nurse Handoff Addendum Report:    Date/time: 10/6/2024, 11:46 PM    Activity Level: assist of 1 and walker    Fall Risk: Yes:  nonskid shoes/slippers when out of bed, patient and family education, and room door open    Current Meds Due: see MAR    Current care needs: cardiac monitor, Q4 neuro checks, echo, continuous pulse ox    Respiratory status: Room air    Vital signs (within last 30 minutes):    Vitals:    10/06/24 2321 10/06/24 2322 10/06/24 2323 10/06/24 2325   BP:    (!) 162/64   Pulse: 69 61 60 79   Resp:    16   Temp:    97.7  F (36.5  C)   TempSrc:    Oral   SpO2: 98% 95% 95% 96%   Weight:           Focused assessment within last 30 minutes:    Pt A&Ox4. Denies pain, numbness or tingling. Neuros intact. Pt has not been out of bed for this RN. Daughter at bedside. Pt voiding in urinal. Cardiac monitor shows SR with PACs.     ED Boarding Nurse name: Shaw Sorto, JAYCEE

## 2024-10-07 NOTE — PHARMACY-ADMISSION MEDICATION HISTORY
Pharmacist Admission Medication History    Admission medication history is complete. The information provided in this note is only as accurate as the sources available at the time of the update.    Information Source(s): Family member via in-person    Changes made to PTA medication list:  Added: entire list  Deleted: None  Changed: None    Allergies reviewed with patient and updates made in EHR: yes    Medication History Completed By: Ta Anderson RPH 10/7/2024 9:22 AM    PTA Med List   Medication Sig Last Dose    acetaminophen (TYLENOL 8 HOUR) 650 MG CR tablet Take 650-1,300 mg by mouth every 8 hours. 10/6/2024 at am    albuterol (PROAIR HFA/PROVENTIL HFA/VENTOLIN HFA) 108 (90 Base) MCG/ACT inhaler Inhale 2 puffs into the lungs every 4 hours as needed for shortness of breath, wheezing or cough.     albuterol (PROVENTIL) (2.5 MG/3ML) 0.083% neb solution Take 2.5 mg by nebulization every 4 hours as needed for shortness of breath, wheezing or cough.     amLODIPine (NORVASC) 2.5 MG tablet Take 2.5 mg by mouth daily. 10/6/2024    aspirin 81 MG EC tablet Take 81 mg by mouth daily. 10/6/2024    calcium carbonate-vitamin D (OSCAL) 500-5 MG-MCG tablet Take 1 tablet by mouth daily. 10/6/2024    fluticasone (FLONASE) 50 MCG/ACT nasal spray Spray 1 spray into both nostrils daily as needed for rhinitis or allergies.     loratadine (CLARITIN) 10 MG tablet Take 10 mg by mouth daily. 10/6/2024    losartan (COZAAR) 50 MG tablet Take 50 mg by mouth 2 times daily. 10/6/2024 at am    Multiple Vitamins-Minerals (PRESERVISION AREDS 2 PO) Take 1 tablet by mouth 2 times daily. 10/6/2024 at am    polyethylene glycol (MIRALAX) 17 g packet Take 17 g by mouth daily. 10/6/2024    simvastatin (ZOCOR) 20 MG tablet Take 20 mg by mouth at bedtime. 10/5/2024    terazosin (HYTRIN) 5 MG capsule Take 5 mg by mouth 2 times daily. 10/6/2024 at am

## 2024-10-07 NOTE — CONSULTS
"[facility]    Stroke Consult Note    Reason for Consult:  Numbness    Chief Complaint: Loss of Vision and Numbness     HPI  Berhane Ely is a right handed 89 year old male with a PMH significant for polymyalgia rheumatica, HTN, HLD, macular degeneration (left eye has little vision at baseline), right eye (undergoing injections to preserve vision), [place].  Berhane presented to the ED on 10/6 for evaluation of confusion and speech changes.  Today history obtained from Berhane and Domi, his daughter, at bedside.  Berhane reports yesterday he had left hand numbness that then gradually spread proximally to include his arm.  He also noted difficulty controlling his left hand, and possibly right hand numbness and left leg numbness to a lesser degree.  He endorses his symptoms lasting for several hours before resolving.  He endorses a history of left hand numbness which initially began 2 years ago but has not occurred recently until few weeks ago.  This left hand numbness typically resolves very quickly after shaking his fingers and does not include his arm.  During this new episode of left arm numbness he denied any associated symptoms like difficulty word finding, slurred speech, or changes in his vision.     Domi reports that on Saturday she was called by her mother (Berhane's wife) due to concern for worsening vision.  It was noted that he was knocking items off a table and had difficulty walking with his walker.  Then on Sunday (10/6) Domi was called by her mother due to Berhane having additional difficulty seeing, new confusion and word finding difficulty.  When Pat arrived she noted that Berhane had difficulty getting his words out as if he knew what he wanted to say but was unable to communicate it.  She denies any prior episodes of word finding difficulty.  She estimates the symptoms lasted 30 minutes and then gradually improved as he became \"clearer\".  She notes he continued to become \"clearer\" " "as time went on.     Today, Berhane reports that he \"feels quite well today.\" He denies any left arm and hand numbness or recurrence of his presenting symptoms.  He denies any new neurologic symptoms.  Pat also reports that Berhane seems \"back to his self.\"  Pat reports that Berhane's memory is \"unbelievable\" and often better than hers.  She denies any recent memory changes.  At baseline Berhane lives independently with his wife (also 96 years old) in a [facility].  His son and daughter in law live in the lower unit.  Berhane walks with a walker and is able to accomplish all ADLs except for running errands and driving to appointments.  Pat reports that Berhane \"never has been in the hospital before.\"  Berhane reports 2 falls in the last 6 months 1 in the bathroom/tub and 1 when closing blinds/shades (occurred 1 month ago).  He endorses 1 near fall in that time.    Reviewed Vascular Risk Factors:   - Tobacco use: Non-smoker.   - Personal history of stroke/TIA: No  - Blood pressure: Known history, checks his BP every couple weeks with average -130.  Had a brief period of time when his blood pressure was low which is since resolved.    - Atrial fibrillation: No known personal history   - B symptoms: Denies recent fevers, unintentional weight loss, fatigue, night sweats.   - Endorses always feeling chills and tired    Neuro Evaluation Summarized  MRI and/or Head CT MRI Brain: No acute infarct. FLAIR signal abnormality in the CSF spaces about the right parietal lobe.    SWI Sequence: Sulcal linear hypodensities deposition in the right frontal and parietal lobe, representing superficial hemosiderosis or prominent cortical veins or combination of both. Prominent susceptibility in  bilateral basal ganglia, representing accelerated mineralization. Scattered foci of susceptibility in bilateral cerebral spheres, representing old microhemorrhages.   Intracranial Vasculature CTA Head: No LVO or significant stenosis   Cervical " Vasculature CTA Neck: Approximately 90% stenosis in the proximal left ICA     Echocardiogram EF is 60-65%. Left ventricular systolic function is normal. There is moderate (2+) mitral regurgitation.  Right ventricular systolic pressure is elevated, consistent with moderate pulmonary hypertension.  Mild valvular aortic stenosis. The left atrium is moderately dilated. Right atrial size is normal.    EKG/Telemetry Sinus rhythm with Premature supraventricular complexes   Minimal voltage criteria for LVH, may be normal variant    Other Testing Carotid ultrasound:  1.  Mild plaque formation, velocities consistent with less than 50% stenosis in the right internal carotid artery.  2.  Moderate plaque formation at the carotid bulb, although velocities consistent with less than 50% stenosis in the left internal carotid artery.  3.  Flow within the vertebral arteries is antegrade.    CRP: 14.61 (elevated)  ESR: 26 (elevated)     LDL 10/6/2024: 66 mg/dL   A1C 10/6/2024: 5.7 %     ABCD2 Patients Score   Age ? 60 years 1 point 1   Blood Pressure    SBP ? 140 or DBP ?  90    1 point 1   Clinical Features    - Unilateral weakness    - Speech disturbance w/o weakness    - Other    2 points  1 point    0 points 1   Duration of symptoms    ? 60 minutes    10-59 minutes    < 10 minutes   2 points  1 point  0 points 1-2   Diabetes  1 point 0   Patient s ABCD2 Score (0-7) = 4-5     Impression  Episode of confusion, altered speech, LUE numbness. Symptoms highly suspicious for transient focal neurologic event/amyloid spell vs possible transient ischemic attack versus (ABCD2 score 4-5)  vs focal seizure (due to right parietal lobe flair enhancement). If recurrence and stereotypical episodes occur can consider to EEG and seizure evaluation.     Right parietal lobe, FLAIR signal abnormality concern for late subacute cortical subarachnoid hemorrhage with contrast enhancement (hemosiderosis) versus amyloid inflammation versus  "other.    Pre-diabetes     Recommendations   - Recommended transfer to [facility] or [facility] for further evaluation, primary team notified   - Dr. Sandoval discussed transfer with Pat via phone. Family prefers to discharge and continue with outpatient follow up and continue to monitor for ongoing symptoms.   - Neuro checks and vital signs every 4 hours  - SBP < 160   - Long term goal BP is <130/80 to be achieved as outpatient within several weeks. Tighter control associated with improved vascular outcomes; recommend home blood pressure monitoring and keeping a BP log for primary care follow up  - Discontinue PTA Aspirin 81 mg   - Hold plavix for now   - LDL 66 (goal 40-70); LDL within goal, continue PTA simvastatin 20 mg  - Hgb A1c 5.7%, (goal <7%)  - Encourage Mediterranean diet and daily exercise  - PT/OT/ST consults  - Euthermia, Euglycemia     Diagnostic testing  - Continue telemetry while inpatient  - Obtain limited MRI brain without contrast with SWI sequence (ordered, results above)     Patient Follow-up    - in the next 1-2 week(s) with PCP  - in 6-8 weeks with any stroke JEREMIAH ([phone]) (ordered)  - Recommend repeat MRI brain with and without contrast in 6-8 weeks (ordered)    Thank you for this consult. No further stroke evaluation is recommended, so we will sign off. Please contact us with any additional questions.    Kiera Enriquez PA-C  Vascular Neurology    To page me or covering stroke neurology team member, click here: [facility]COM  Choose \"On Call\" tab at top, then select \"NEUROLOGY/ALL SITES\" from middle drop-down box, press Enter, then look for \"stroke\" or \"telestroke\" for your site.  _____________________________________________________    Clinically Significant Risk Factors Present on Admission         # Hyponatremia: Lowest Na = 133 mmol/L in last 2 days, will monitor as appropriate        # Drug Induced Platelet Defect: home medication list includes an antiplatelet medication   # Hypertension: Home " "medication list includes antihypertensive(s)      # Anemia: based on hgb <11       # Overweight: Estimated body mass index is 25.08 kg/m  as calculated from the following:    Height as of this encounter: 1.702 m (5' 7\").    Weight as of this encounter: 72.6 kg (160 lb 1.6 oz).              Past Medical History    No past medical history on file.  Medications   Home Meds  Prior to Admission medications    Not on File       Scheduled Meds  Current Facility-Administered Medications   Medication Dose Route Frequency Provider Last Rate Last Admin    aspirin EC tablet 81 mg  81 mg Oral Daily Mark Bonner MD   81 mg at 10/07/24 0840    [Held by provider] clopidogrel (PLAVIX) tablet 75 mg  75 mg Oral Daily Mark Bonner MD   75 mg at 10/07/24 0840       Infusion Meds  Current Facility-Administered Medications   Medication Dose Route Frequency Provider Last Rate Last Admin       Allergies   Allergies   Allergen Reactions    Penicillin G      Lip swelling    Bee Venom      Swelling    Tramadol Nausea and Vomiting          PHYSICAL EXAMINATION   Temp:  [97.4  F (36.3  C)-98.2  F (36.8  C)] 98.2  F (36.8  C)  Pulse:  [58-94] 86  Resp:  [16-20] 18  BP: (110-164)/(58-95) 149/70  SpO2:  [90 %-100 %] 97 %    General Exam  General:  patient lying in bed without any acute distress    HEENT:  normocephalic/atraumatic  Pulmonary:  no respiratory distress    Neuro Exam  Mental Status:  alert, oriented month and year, not oriented to age (answered 89 yo), follows commands, speech with intermittent dysarthria, difficulty naming NIHSS cards (I.e reported seeing a  and wife in the first NIHSS card; was able to correctly name bike but not other objects),  repetition normal, able to read NIHSS cards without difficulty   Cranial Nerves:  visual fields grossly intact with peripheral field testing (tested by nurse, chronic near vision loss of left eye), EOMI with normal smooth pursuit, facial sensation intact and symmetric (tested by " nurse), facial movements symmetric, hearing not formally tested but intact to very loud conversation, shoulder shrug R > L, tongue protrusion midline  Motor:  no abnormal movements, able to move all limbs antigravity spontaneously with no signs of hemiparesis observed, no pronator drift   Reflexes:  unable to test (telestroke)  Sensory:  light touch sensation intact and symmetric throughout upper and lower extremities (assessed by nurse), no extinction on double simultaneous stimulation (assessed by nurse)  Coordination:  normal finger-to-nose and right heel-to-shin without dysmetria, dysmetria with left heel-to-shin  Station/Gait:  unable to test due to telestroke    Stroke Scales  NIHSS  1a. Level of Consciousness 0-->Alert, keenly responsive   1b. LOC Questions 1-->Answers one question correctly (answered 89 for age)   1c. LOC Commands 0-->Performs both tasks correctly   2.   Best Gaze 0-->Normal   3.   Visual 0-->No visual loss   4.   Facial Palsy 0-->Normal symmetrical movements   5a. Motor Arm, Left 0-->No drift, limb holds 90 (or 45) degrees for full 10 secs   5b. Motor Arm, Right 0-->No drift, limb holds 90 (or 45) degrees for full 10 secs   6a. Motor Leg, Left 0-->No drift, leg holds 30 degree position for full 5 secs   6b. Motor Leg, right 0-->No drift, leg holds 30 degree position for full 5 secs   7.   Limb Ataxia 1-->Present in one limb (LLE)   8.   Sensory 0-->Normal, no sensory loss   9.   Best Language 1-->Mild-to-moderate aphasia, some obvious loss of fluency or facility of comprehension, without significant limitation on ideas expressed or form of expression. Reduction of speech and/or comprehension, however, makes conversation. . . (see row details)   10. Dysarthria 1-->Mild-to-moderate dysarthria, patient slurs at least some words and, at worst, can be understood with some difficulty   11. Extinction and Inattention  0-->No abnormality   Total 4 (10/07/24 6824)     Imaging  I personally reviewed  "all imaging; relevant findings per HPI.    Labs Data   CBC  Recent Labs   Lab 10/07/24  0540 10/06/24  1424   WBC 8.2 8.5   RBC 3.57* 3.65*   HGB 10.9* 11.1*   HCT 33.1* 34.0*    239     Basic Metabolic Panel   Recent Labs   Lab 10/07/24  0540 10/06/24  1424    133*   POTASSIUM 4.0 4.3   CHLORIDE 102 99   CO2 22 22   BUN 20.5 29.0*   CR 0.97 1.10   * 130*   LIANET 8.6* 8.9     Liver Panel  No results for input(s): \"PROTTOTAL\", \"ALBUMIN\", \"BILITOTAL\", \"ALKPHOS\", \"AST\", \"ALT\", \"BILIDIRECT\" in the last 168 hours.  INR    Recent Labs   Lab Test 10/06/24  1424   INR 1.03           Stroke Consult Data Data   Telestroke Service Details  (for non-emergent stroke consult with tele)  Video start time 10/07/24   0904   Video end time 10/07/24   0958   Type of service telemedicine diagnostic assessment of acute neurological changes   Reason telemedicine is appropriate patient requires assessment with a specialist for diagnosis and treatment of neurological symptoms   Mode of transmission secure interactive audio and video communication per Jose Antonio   Originating site (patient location) [facility]    Distant site (provider location) [facility]     I have personally spent a total of 80 minutes providing care today, time spent in reviewing medical records and devising the plan as recorded above.   "

## 2024-10-07 NOTE — ED NOTES
[facility]  ED Nurse Handoff Report    ED Chief complaint: Loss of Vision and Numbness  . ED Diagnosis:   Final diagnoses:   TIA (transient ischemic attack)   Expressive aphasia       Allergies:   Allergies   Allergen Reactions    Penicillin G      Lip swelling    Bee Venom      Swelling       Code Status: Full Code    Activity level - Baseline/Home:  independent. With walker  Activity Level - Current:   assist of 1.   Lift room needed: No.   Bariatric: No   Needed: No   Isolation: No.   Infection: Not Applicable.     Respiratory status: Room air    Vital Signs (within 30 minutes):   Vitals:    10/06/24 1700 10/06/24 1730 10/06/24 1934 10/1935   BP: (!) 149/60 (!) 153/60 (!) 148/88    Pulse: 59 60 73    Resp:       Temp:       TempSrc:       SpO2: 100% 98% 90% 97%   Weight:           Cardiac Rhythm:  ,   Cardiac  Cardiac Rhythm: Normal sinus rhythm  Pain level:    Patient confused: No.   Patient Falls Risk: nonskid shoes/slippers when out of bed and patient and family education.   Elimination Status: Has voided     Patient Report - Initial Complaint: Loss of vision, Numbness.   Focused Assessment: Patient presents with daughter and son-in-law for evaluation of left arm numbness and confusion.  Patient lives independently with his wife.  Daughter states she was called by her mother yesterday morning as she was concerned patient was having trouble speaking and running into things with his walker which is abnormal for him.  When she arrived around 3PM, patient apparently was at his baseline and was not having any trouble with speaking and she did not notice anything off.  Patient also stated to her he had left arm numbness and tingling but does not have this currently.  He says this is a chronic problem for him but was more intense yesterday.       Abnormal Results:   Labs Ordered and Resulted from Time of ED Arrival to Time of ED Departure   BASIC METABOLIC PANEL - Abnormal        Result Value    Sodium 133 (*)     Potassium 4.3      Chloride 99      Carbon Dioxide (CO2) 22      Anion Gap 12      Urea Nitrogen 29.0 (*)     Creatinine 1.10      GFR Estimate 61      Calcium 8.9      Glucose 130 (*)    TROPONIN T, HIGH SENSITIVITY - Abnormal    Troponin T, High Sensitivity 26 (*)    CBC WITH PLATELETS AND DIFFERENTIAL - Abnormal    WBC Count 8.5      RBC Count 3.65 (*)     Hemoglobin 11.1 (*)     Hematocrit 34.0 (*)     MCV 93      MCH 30.4      MCHC 32.6      RDW 14.6      Platelet Count 239      % Neutrophils 78      % Lymphocytes 11      % Monocytes 7      % Eosinophils 4      % Basophils 0      % Immature Granulocytes 1      NRBCs per 100 WBC 0      Absolute Neutrophils 6.6      Absolute Lymphocytes 0.9      Absolute Monocytes 0.6      Absolute Eosinophils 0.3      Absolute Basophils 0.0      Absolute Immature Granulocytes 0.1      Absolute NRBCs 0.0     ERYTHROCYTE SEDIMENTATION RATE AUTO - Abnormal    Erythrocyte Sedimentation Rate 26 (*)    CRP INFLAMMATION - Abnormal    CRP Inflammation 14.61 (*)    INR - Normal    INR 1.03     PARTIAL THROMBOPLASTIN TIME - Normal    aPTT 28     ROUTINE UA WITH MICROSCOPIC REFLEX TO CULTURE - Normal    Color Urine Straw      Appearance Urine Clear      Glucose Urine Negative      Bilirubin Urine Negative      Ketones Urine Negative      Specific Gravity Urine 1.019      Blood Urine Negative      pH Urine 6.5      Protein Albumin Urine Negative      Urobilinogen Urine Normal      Nitrite Urine Negative      Leukocyte Esterase Urine Negative      RBC Urine <1      WBC Urine <1     GLUCOSE MONITOR NURSING POCT        US Carotid Bilateral   Final Result   IMPRESSION:   1.  Mild plaque formation, velocities consistent with less than 50% stenosis in the right internal carotid artery.   2.  Moderate plaque formation at the carotid bulb, although velocities consistent with less than 50% stenosis in the left internal carotid artery.   3.  Flow within the  vertebral arteries is antegrade.      MR Brain w/o & w Contrast   Final Result   IMPRESSION:   1.  No acute or subacute ischemic change.   2.  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   3.  FLAIR signal abnormality in the CSF spaces about the right parietal lobe, nonspecific, and favored to represent mild vascular congestion from the subjacent presumed vascular malformation. Additional considerations would include subarachnoid    hemorrhage, which is felt to be less likely (see discussion above), or an exudate from and infectious or inflammatory process. A follow-up head CT is recommended.      CTA Head Neck with Contrast   Final Result   IMPRESSION:    HEAD CT:   1.  No CT evidence for acute intracranial process.   2.  Brain atrophy and presumed chronic microvascular ischemic changes as above.      HEAD CTA:    1.  No large vessel occlusion or hemodynamically significant stenosis throughout the [place] arteries.      NECK CTA:   1.  High-grade approximately 90% stenosis in the proximal left internal carotid artery. Otherwise, no hemodynamically significant stenosis in the neck vessels.    2.  No evidence for dissection.      CT Head w/o Contrast   Final Result   IMPRESSION:    HEAD CT:   1.  No CT evidence for acute intracranial process.   2.  Brain atrophy and presumed chronic microvascular ischemic changes as above.      HEAD CTA:    1.  No large vessel occlusion or hemodynamically significant stenosis throughout the [place] arteries.      NECK CTA:   1.  High-grade approximately 90% stenosis in the proximal left internal carotid artery. Otherwise, no hemodynamically significant stenosis in the neck vessels.    2.  No evidence for dissection.          Treatments provided: See Mar  Family Comments: Daughter at bedside  OBS brochure/video discussed/provided to patient:  Yes  ED Medications:   Medications    senna-docusate (SENOKOT-S/PERICOLACE) 8.6-50 MG per tablet 1 tablet (has no administration in time range)     Or   senna-docusate (SENOKOT-S/PERICOLACE) 8.6-50 MG per tablet 2 tablet (has no administration in time range)   ondansetron (ZOFRAN ODT) ODT tab 4 mg (has no administration in time range)     Or   ondansetron (ZOFRAN) injection 4 mg (has no administration in time range)   iopamidol (ISOVUE-370) solution 67 mL (67 mLs Intravenous $Given 10/6/24 5939)   sodium chloride (PF) 0.9% PF flush 80 mL (80 mLs Intravenous $Given 10/6/24 6649)   gadobutrol (GADAVIST) injection 8 mL (8 mLs Intravenous $Given 10/6/24 1821)   aspirin (ASA) chewable tablet 81 mg (81 mg Oral $Given 10/6/24 2130)   clopidogrel (PLAVIX) tablet 300 mg (300 mg Oral $Given 10/6/24 2130)       Drips infusing:  No  For the majority of the shift this patient was Green.   Interventions performed were N/A.    Sepsis treatment initiated: No    Cares/treatment/interventions/medications to be completed following ED care: Continue with POC.    ED Nurse Name: Kenya Porras RN  10:58 PM   RECEIVING UNIT ED HANDOFF REVIEW    Above ED Nurse Handoff Report was reviewed: Yes  Reviewed by: AKIN DIANA RN on October 7, 2024 at 12:41 AM   I Sarah called the ED to inform them the note was read: Yes

## 2024-10-08 ENCOUNTER — TELEPHONE (OUTPATIENT)
Dept: NEUROLOGY | Facility: CLINIC | Age: 89
End: 2024-10-08
Payer: MEDICARE

## 2024-10-08 ENCOUNTER — PATIENT OUTREACH (OUTPATIENT)
Dept: CARE COORDINATION | Facility: CLINIC | Age: 89
End: 2024-10-08
Payer: MEDICARE

## 2024-10-08 DIAGNOSIS — R47.01 EXPRESSIVE APHASIA: Primary | ICD-10-CM

## 2024-10-08 NOTE — PLAN OF CARE
Physical Therapy Discharge Summary    Reason for therapy discharge:    Discharged to home with home therapy.    Progress towards therapy goal(s). See goals on Care Plan in [facility] electronic health record for goal details.  Goals not met.  Barriers to achieving goals:   discharge from facility.    Therapy recommendation(s):    Continued therapy is recommended.  Rationale/Recommendations:  HHPT to maximize safety and indep with mobility in the home environment.

## 2024-10-08 NOTE — TELEPHONE ENCOUNTER
"Per Dr. Sandoval recommendation placing orders for outpatient CT Chest Abdomen and Pelvis as well as outpatient EEG.     Today, was able to call and speak with Pat regarding additional recommended diagnostics.  She reports that yesterday evening after Berhane returned home he had some \"hospital delirium\" as he was hallucinating. He then slept for 8 hours and this morning she appreciates that his mind is clear and has had no difficulty walking with his walker. She also notes his orientation is intact and has not had any recurrence of his word finding difficulty and no report. Of note, his blood pressure has been on the lower end today with readings such as 84/40,  97/42, 102/45, 105/47. She is currently holding his blood pressure medications and is in contact with his primary care provider regarding these readings.  [facility] has also arranged for Berhane to have a visit with his PCP on Thursday next week. Reviewed [facility] stroke warning signs and if any should occur Berhane should immediately present to the nearest emergency room.  Gave Pat the neurology clinic number [phone] if she has any additional questions going forward.    Kiera Enriquez PA-C  Vascular Neurology    "

## 2024-10-08 NOTE — PROGRESS NOTES
"Clinic Care Coordination Contact  Transitions of Care Outreach  Chief Complaint   Patient presents with    Clinic Care Coordination - Post Hospital       Most Recent Admission Date: 10/6/2024   Most Recent Admission Diagnosis: TIA (transient ischemic attack) - G45.9  Expressive aphasia - R47.01     Most Recent Discharge Date: 10/7/2024   Most Recent Discharge Diagnosis: TIA (transient ischemic attack) - G45.9  Expressive aphasia - R47.01  Transient neurologic deficit - R29.818     Transitions of Care Assessment    Discharge Assessment  How are you doing now that you are home?: \"I spent the night here because I wasn't sure if he was having some post-hospital delirum. He was doing some twitching with his hands in the hospital but they said not to be shocked by this after being in the hospital. At home he knew where he was but he was still fidgeting and hallucinating a bit. His BP was 102 over 45 earlier. And now it was in the 80's systolic. He mentioned he just didn't feel right so I'm watching him so I will keep checking his BP and monitoring his medications.\"  How are your symptoms? (Red Flag symptoms escalate to triage hotline per guidelines): Improved;Unchanged  Do you know how to contact your clinic care team if you have future questions or changes to your health status? : Yes  Does the patient have their discharge instructions? : Yes  Does the patient have questions regarding their discharge instructions? : No  Were you started on any new medications or were there changes to any of your previous medications? : No  Does the patient have all of their medications?: Yes  Do you have questions regarding any of your medications? : No  Do you have all of your needed medical supplies or equipment (DME)?  (i.e. oxygen tank, CPAP, cane, etc.): Yes    Post-op ([facility] CTA Only)  If the patient had a surgery or procedure, do they have any questions for a nurse?: No    Follow up Plan     Discharge Follow-Up  Discharge follow up " appointment scheduled in alignment with recommended follow up timeframe or Transitions of Risk Category? (Low = within 30 days; Moderate= within 14 days; High= within 7 days): No  Patient's follow up appointment not scheduled: Patient declined scheduling support. Education on the importance of transitions of care follow up. Provided scheduling phone number.    No future appointments.    Outpatient Plan as outlined on AVS reviewed with patient.    For any urgent concerns, please contact our 24 hour nurse triage line: [phone] ([phone])       MELISSA Staton  [phone]  [facility]  [facility]

## 2024-10-09 ENCOUNTER — TELEPHONE (OUTPATIENT)
Dept: NEUROLOGY | Facility: CLINIC | Age: 89
End: 2024-10-09
Payer: MEDICARE

## 2024-10-11 ENCOUNTER — TELEPHONE (OUTPATIENT)
Dept: NEUROLOGY | Facility: CLINIC | Age: 89
End: 2024-10-11
Payer: MEDICARE

## 2024-10-11 NOTE — TELEPHONE ENCOUNTER
[facility] Call Center    Phone Message    May a detailed message be left on voicemail: yes     Reason for Call: Appointment Intake    Referring Provider Name: Kiera Khalil PA-C  Diagnosis and/or Symptoms: TIA vs amyloid spells vs other    Action Taken: Message routed to:  [facility] Neurology    Travel Screening: Not Applicable     Date of Service:

## 2024-10-11 NOTE — TELEPHONE ENCOUNTER
Called Pat to discuss plan below. Would like to offer appointment with stroke [facility] team in 6-8 weeks per referral. OK to use ANY available return stroke slot with ANY stroke [facility]. Patient should complete repeat imaging PRIOR to the visit. Ordered. Please provide Imaging scheduling- [phone].    PEPE FRANCES CMA

## 2024-10-11 NOTE — TELEPHONE ENCOUNTER
[facility] Call Center    Phone Message    May a detailed message be left on voicemail: yes     Reason for Call: Pat patients daughter called to schedul an appointment with [facility] provider, writer unable to pull forward templates for [facility]. Please assist with request     Action Taken: cs neurology      Travel Screening: Not Applicable     Date of Service:

## 2024-10-11 NOTE — TELEPHONE ENCOUNTER
Pt daughter Pat was warm transferred to clinic and patient scheduled with Maria De Jesus Colón PA-C 12/11, MRI already scheduled 12/9

## 2024-10-11 NOTE — TELEPHONE ENCOUNTER
Called to assist with scheduling, left VM. Stroke [facility] has TONS of openings to offer. OK to use return stroke slot.    PEPE FRANCES CMA

## 2024-10-11 NOTE — TELEPHONE ENCOUNTER
Patient Follow-up    - in the next 1-2 week(s) with PCP  - in 6-8 weeks with any stroke [facility] ([phone]) (ordered)  - Recommend repeat MRI brain with and without contrast in 6-8 weeks (ordered)

## 2024-10-11 NOTE — TELEPHONE ENCOUNTER
Schedule Patient With: Specific Stroke [facility] Only   Specify Provider: Any stroke [facility]   Specific Diagnosis: TIA vs amyloid spells vs other   Follow up range in weeks (after discharge) 6-8   Contact: Family Member   Name and Phone: Domi (daughter) [phone]   Scheduling Instructions: [facility] will call you to coordinate care as prescribed by your provider. If you don t hear from a representative within 2 business days, please call [phone].

## 2024-11-10 ENCOUNTER — HEALTH MAINTENANCE LETTER (OUTPATIENT)
Age: 89
End: 2024-11-10